# Patient Record
Sex: FEMALE | Race: WHITE | Employment: FULL TIME | URBAN - METROPOLITAN AREA
[De-identification: names, ages, dates, MRNs, and addresses within clinical notes are randomized per-mention and may not be internally consistent; named-entity substitution may affect disease eponyms.]

---

## 2021-11-05 ENCOUNTER — HOSPITAL ENCOUNTER (INPATIENT)
Age: 51
LOS: 5 days | Discharge: HOME | DRG: 291 | End: 2021-11-10
Attending: STUDENT IN AN ORGANIZED HEALTH CARE EDUCATION/TRAINING PROGRAM | Admitting: HOSPITALIST
Payer: COMMERCIAL

## 2021-11-05 ENCOUNTER — APPOINTMENT (OUTPATIENT)
Dept: GENERAL RADIOLOGY | Age: 51
DRG: 291 | End: 2021-11-05
Attending: STUDENT IN AN ORGANIZED HEALTH CARE EDUCATION/TRAINING PROGRAM
Payer: COMMERCIAL

## 2021-11-05 DIAGNOSIS — R77.8 ELEVATED TROPONIN: Primary | ICD-10-CM

## 2021-11-05 DIAGNOSIS — I48.91 RAPID ATRIAL FIBRILLATION (HCC): ICD-10-CM

## 2021-11-05 DIAGNOSIS — I50.9 HEART FAILURE, UNSPECIFIED HF CHRONICITY, UNSPECIFIED HEART FAILURE TYPE (HCC): ICD-10-CM

## 2021-11-05 PROBLEM — Q24.8 HYPERTROPHIC OBSTRUCTIVE CARDIOMYOPATHY, CONGENITAL: Status: ACTIVE | Noted: 2021-11-05

## 2021-11-05 LAB
ALBUMIN SERPL-MCNC: 3.2 G/DL (ref 3.5–5)
ALBUMIN/GLOB SERPL: 1 {RATIO} (ref 1.1–2.2)
ALP SERPL-CCNC: 94 U/L (ref 45–117)
ALT SERPL-CCNC: 26 U/L (ref 12–78)
ANION GAP SERPL CALC-SCNC: 10 MMOL/L (ref 5–15)
AST SERPL W P-5'-P-CCNC: 20 U/L (ref 15–37)
BASOPHILS # BLD: 0.1 K/UL (ref 0–0.1)
BASOPHILS NFR BLD: 1 % (ref 0–1)
BILIRUB SERPL-MCNC: 0.4 MG/DL (ref 0.2–1)
BNP SERPL-MCNC: 7010 PG/ML
BUN SERPL-MCNC: 11 MG/DL (ref 6–20)
BUN/CREAT SERPL: 19 (ref 12–20)
CA-I BLD-MCNC: 8.8 MG/DL (ref 8.5–10.1)
CHLORIDE SERPL-SCNC: 96 MMOL/L (ref 97–108)
CO2 SERPL-SCNC: 22 MMOL/L (ref 21–32)
CREAT SERPL-MCNC: 0.58 MG/DL (ref 0.55–1.02)
DIFFERENTIAL METHOD BLD: ABNORMAL
EOSINOPHIL # BLD: 0.1 K/UL (ref 0–0.4)
EOSINOPHIL NFR BLD: 1 % (ref 0–7)
ERYTHROCYTE [DISTWIDTH] IN BLOOD BY AUTOMATED COUNT: 13.2 % (ref 11.5–14.5)
GLOBULIN SER CALC-MCNC: 3.3 G/DL (ref 2–4)
GLUCOSE SERPL-MCNC: 115 MG/DL (ref 65–100)
HCT VFR BLD AUTO: 40.5 % (ref 35–47)
HGB BLD-MCNC: 14.1 G/DL (ref 11.5–16)
IMM GRANULOCYTES # BLD AUTO: 0.1 K/UL (ref 0–0.04)
IMM GRANULOCYTES NFR BLD AUTO: 1 % (ref 0–0.5)
LYMPHOCYTES # BLD: 1.8 K/UL (ref 0.8–3.5)
LYMPHOCYTES NFR BLD: 14 % (ref 12–49)
MAGNESIUM SERPL-MCNC: 1.5 MG/DL (ref 1.6–2.4)
MCH RBC QN AUTO: 34.1 PG (ref 26–34)
MCHC RBC AUTO-ENTMCNC: 34.8 G/DL (ref 30–36.5)
MCV RBC AUTO: 97.8 FL (ref 80–99)
MONOCYTES # BLD: 1.9 K/UL (ref 0–1)
MONOCYTES NFR BLD: 14 % (ref 5–13)
NEUTS SEG # BLD: 9.1 K/UL (ref 1.8–8)
NEUTS SEG NFR BLD: 69 % (ref 32–75)
NRBC # BLD: 0 K/UL (ref 0–0.01)
NRBC BLD-RTO: 0 PER 100 WBC
PLATELET # BLD AUTO: 367 K/UL (ref 150–400)
PMV BLD AUTO: 11.2 FL (ref 8.9–12.9)
POTASSIUM SERPL-SCNC: 4.1 MMOL/L (ref 3.5–5.1)
PROT SERPL-MCNC: 6.5 G/DL (ref 6.4–8.2)
RBC # BLD AUTO: 4.14 M/UL (ref 3.8–5.2)
SODIUM SERPL-SCNC: 128 MMOL/L (ref 136–145)
TROPONIN-HIGH SENSITIVITY: 581 NG/L (ref 0–51)
WBC # BLD AUTO: 13 K/UL (ref 3.6–11)

## 2021-11-05 PROCEDURE — 96376 TX/PRO/DX INJ SAME DRUG ADON: CPT

## 2021-11-05 PROCEDURE — 36415 COLL VENOUS BLD VENIPUNCTURE: CPT

## 2021-11-05 PROCEDURE — 74011250636 HC RX REV CODE- 250/636: Performed by: STUDENT IN AN ORGANIZED HEALTH CARE EDUCATION/TRAINING PROGRAM

## 2021-11-05 PROCEDURE — 85025 COMPLETE CBC W/AUTO DIFF WBC: CPT

## 2021-11-05 PROCEDURE — 96374 THER/PROPH/DIAG INJ IV PUSH: CPT

## 2021-11-05 PROCEDURE — 80053 COMPREHEN METABOLIC PANEL: CPT

## 2021-11-05 PROCEDURE — 96361 HYDRATE IV INFUSION ADD-ON: CPT

## 2021-11-05 PROCEDURE — 74011000250 HC RX REV CODE- 250: Performed by: STUDENT IN AN ORGANIZED HEALTH CARE EDUCATION/TRAINING PROGRAM

## 2021-11-05 PROCEDURE — 83735 ASSAY OF MAGNESIUM: CPT

## 2021-11-05 PROCEDURE — 96375 TX/PRO/DX INJ NEW DRUG ADDON: CPT

## 2021-11-05 PROCEDURE — 83880 ASSAY OF NATRIURETIC PEPTIDE: CPT

## 2021-11-05 PROCEDURE — 65270000029 HC RM PRIVATE

## 2021-11-05 PROCEDURE — 96365 THER/PROPH/DIAG IV INF INIT: CPT

## 2021-11-05 PROCEDURE — 84484 ASSAY OF TROPONIN QUANT: CPT

## 2021-11-05 PROCEDURE — 93005 ELECTROCARDIOGRAM TRACING: CPT

## 2021-11-05 PROCEDURE — 99285 EMERGENCY DEPT VISIT HI MDM: CPT

## 2021-11-05 PROCEDURE — 71045 X-RAY EXAM CHEST 1 VIEW: CPT

## 2021-11-05 RX ORDER — SODIUM CHLORIDE 0.9 % (FLUSH) 0.9 %
5-40 SYRINGE (ML) INJECTION AS NEEDED
Status: DISCONTINUED | OUTPATIENT
Start: 2021-11-05 | End: 2021-11-10 | Stop reason: HOSPADM

## 2021-11-05 RX ORDER — APIXABAN 5 MG/1
5 TABLET, FILM COATED ORAL 2 TIMES DAILY
Status: ON HOLD | COMMUNITY
Start: 2021-08-11 | End: 2021-11-10 | Stop reason: SDUPTHER

## 2021-11-05 RX ORDER — METOPROLOL TARTRATE 5 MG/5ML
1.25 INJECTION INTRAVENOUS ONCE
Status: COMPLETED | OUTPATIENT
Start: 2021-11-05 | End: 2021-11-05

## 2021-11-05 RX ORDER — DILTIAZEM HYDROCHLORIDE 5 MG/ML
5 INJECTION INTRAVENOUS ONCE
Status: COMPLETED | OUTPATIENT
Start: 2021-11-05 | End: 2021-11-05

## 2021-11-05 RX ORDER — DILTIAZEM HCL/D5W 125 MG/125
0-15 PLASTIC BAG, INJECTION (ML) INTRAVENOUS
Status: DISCONTINUED | OUTPATIENT
Start: 2021-11-05 | End: 2021-11-07

## 2021-11-05 RX ORDER — LANOLIN ALCOHOL/MO/W.PET/CERES
400 CREAM (GRAM) TOPICAL DAILY
COMMUNITY

## 2021-11-05 RX ORDER — ACETAMINOPHEN 325 MG/1
650 TABLET ORAL
Status: DISCONTINUED | OUTPATIENT
Start: 2021-11-05 | End: 2021-11-10 | Stop reason: HOSPADM

## 2021-11-05 RX ORDER — METOPROLOL SUCCINATE 100 MG/1
100 TABLET, EXTENDED RELEASE ORAL DAILY
COMMUNITY
Start: 2021-08-08 | End: 2021-11-10

## 2021-11-05 RX ORDER — DILTIAZEM HCL/D5W 125 MG/125
0-15 PLASTIC BAG, INJECTION (ML) INTRAVENOUS
Status: DISCONTINUED | OUTPATIENT
Start: 2021-11-05 | End: 2021-11-05

## 2021-11-05 RX ORDER — SODIUM CHLORIDE 0.9 % (FLUSH) 0.9 %
5-40 SYRINGE (ML) INJECTION EVERY 8 HOURS
Status: DISCONTINUED | OUTPATIENT
Start: 2021-11-05 | End: 2021-11-10 | Stop reason: HOSPADM

## 2021-11-05 RX ORDER — DILTIAZEM HYDROCHLORIDE 5 MG/ML
5 INJECTION INTRAVENOUS
Status: COMPLETED | OUTPATIENT
Start: 2021-11-05 | End: 2021-11-05

## 2021-11-05 RX ORDER — FUROSEMIDE 10 MG/ML
20 INJECTION INTRAMUSCULAR; INTRAVENOUS
Status: COMPLETED | OUTPATIENT
Start: 2021-11-05 | End: 2021-11-05

## 2021-11-05 RX ADMIN — DILTIAZEM HYDROCHLORIDE 5 MG: 5 INJECTION INTRAVENOUS at 22:39

## 2021-11-05 RX ADMIN — Medication 2.5 MG/HR: at 23:00

## 2021-11-05 RX ADMIN — FUROSEMIDE 20 MG: 10 INJECTION, SOLUTION INTRAMUSCULAR; INTRAVENOUS at 22:39

## 2021-11-05 RX ADMIN — METOPROLOL TARTRATE 1.25 MG: 1 INJECTION, SOLUTION INTRAVENOUS at 20:38

## 2021-11-05 RX ADMIN — SODIUM CHLORIDE 500 ML: 9 INJECTION, SOLUTION INTRAVENOUS at 20:37

## 2021-11-05 RX ADMIN — DILTIAZEM HYDROCHLORIDE 5 MG: 5 INJECTION INTRAVENOUS at 21:28

## 2021-11-05 NOTE — ED TRIAGE NOTES
[facility] pt stated that she has cardiomyopathy with afib and has been taking magnesium supplements to help with it; it was prescribed by her electrophysiologist/cardiologist d/t her ICD; pt doubled up on it because she wasn't feeling well and they were about to travel; c/o feeling wiped out, SOB , and nausea

## 2021-11-06 ENCOUNTER — APPOINTMENT (OUTPATIENT)
Dept: NON INVASIVE DIAGNOSTICS | Age: 51
DRG: 291 | End: 2021-11-06
Attending: HOSPITALIST
Payer: COMMERCIAL

## 2021-11-06 LAB
25(OH)D3 SERPL-MCNC: 10.8 NG/ML (ref 30–100)
ALBUMIN SERPL-MCNC: 3.4 G/DL (ref 3.5–5)
ANION GAP SERPL CALC-SCNC: 10 MMOL/L (ref 5–15)
ANION GAP SERPL CALC-SCNC: 8 MMOL/L (ref 5–15)
BASOPHILS # BLD: 0.1 K/UL (ref 0–0.1)
BASOPHILS NFR BLD: 1 % (ref 0–1)
BUN SERPL-MCNC: 9 MG/DL (ref 6–20)
BUN SERPL-MCNC: 9 MG/DL (ref 6–20)
BUN/CREAT SERPL: 16 (ref 12–20)
BUN/CREAT SERPL: 16 (ref 12–20)
CA-I BLD-MCNC: 9 MG/DL (ref 8.5–10.1)
CA-I BLD-MCNC: 9.1 MG/DL (ref 8.5–10.1)
CHLORIDE SERPL-SCNC: 98 MMOL/L (ref 97–108)
CHLORIDE SERPL-SCNC: 98 MMOL/L (ref 97–108)
CHOLEST SERPL-MCNC: 176 MG/DL
CK SERPL-CCNC: 182 U/L (ref 26–192)
CO2 SERPL-SCNC: 25 MMOL/L (ref 21–32)
CO2 SERPL-SCNC: 25 MMOL/L (ref 21–32)
CREAT SERPL-MCNC: 0.55 MG/DL (ref 0.55–1.02)
CREAT SERPL-MCNC: 0.55 MG/DL (ref 0.55–1.02)
DIFFERENTIAL METHOD BLD: ABNORMAL
ECHO AO ASC DIAM: 2.7 CM
ECHO AO ROOT DIAM: 3.2 CM
ECHO AV AREA PEAK VELOCITY: 2.17 CM2
ECHO AV AREA VTI: 2.44 CM2
ECHO AV AREA/BSA PEAK VELOCITY: 1.4 CM2/M2
ECHO AV AREA/BSA VTI: 1.6 CM2/M2
ECHO AV MEAN GRADIENT: 2 MMHG
ECHO AV MEAN VELOCITY: 65.2 CM/S
ECHO AV PEAK GRADIENT: 4 MMHG
ECHO AV PEAK VELOCITY: 96.5 CM/S
ECHO AV VTI: 15.6 CM
ECHO LA AREA 2C: 17.4 CM2
ECHO LA MAJOR AXIS: 5.9 CM
ECHO LA MAJOR AXIS: 5.9 CM
ECHO LV E' SEPTAL VELOCITY: 4.37 CM/S
ECHO LV EDV A2C: 14 CM3
ECHO LV EDV A2C: 77.9 CM3
ECHO LV ESV A2C: 27.5 CM3
ECHO LV INTERNAL DIMENSION DIASTOLIC: 4.27 CM (ref 3.9–5.3)
ECHO LV INTERNAL DIMENSION SYSTOLIC: 3.02 CM
ECHO LV IVSD: 1.92 CM (ref 0.6–0.9)
ECHO LV MASS 2D: 262.2 G (ref 67–162)
ECHO LV MASS INDEX 2D: 171.3 G/M2 (ref 43–95)
ECHO LV POSTERIOR WALL DIASTOLIC: 1.13 CM (ref 0.6–0.9)
ECHO LVOT DIAM: 2 CM
ECHO LVOT PEAK GRADIENT: 2 MMHG
ECHO LVOT PEAK VELOCITY: 66.8 CM/S
ECHO LVOT SV: 38 CM3
ECHO LVOT VTI: 12.1 CM
ECHO MV A VELOCITY: 81.2 CM/S
ECHO MV AREA PHT: 6.67 CM2
ECHO MV E VELOCITY: 46.4 CM/S
ECHO MV E/A RATIO: 0.57
ECHO MV E/E' SEPTAL: 10.62
ECHO MV PRESSURE HALF TIME (PHT): 33 MS
ECHO MV REGURGITANT VTIA: 119 CM
ECHO PV MAX VELOCITY: 75.2 CM/S
ECHO PV MEAN GRADIENT: 1 MMHG
ECHO PV PEAK INSTANTANEOUS GRADIENT SYSTOLIC: 2 MMHG
ECHO PV VTI: 16.4 CM
ECHO TV MAX VELOCITY: 267 CM/S
ECHO TV MEAN GRADIENT: 53 MMHG
ECHO TV REGURGITANT PEAK GRADIENT: 29 MMHG
EOSINOPHIL # BLD: 0.1 K/UL (ref 0–0.4)
EOSINOPHIL NFR BLD: 1 % (ref 0–7)
ERYTHROCYTE [DISTWIDTH] IN BLOOD BY AUTOMATED COUNT: 13.1 % (ref 11.5–14.5)
GLUCOSE SERPL-MCNC: 106 MG/DL (ref 65–100)
GLUCOSE SERPL-MCNC: 107 MG/DL (ref 65–100)
HCT VFR BLD AUTO: 37 % (ref 35–47)
HDLC SERPL-MCNC: 31 MG/DL
HDLC SERPL: 5.7 {RATIO} (ref 0–5)
HGB BLD-MCNC: 12.8 G/DL (ref 11.5–16)
IMM GRANULOCYTES # BLD AUTO: 0.1 K/UL (ref 0–0.04)
IMM GRANULOCYTES NFR BLD AUTO: 1 % (ref 0–0.5)
LDLC SERPL CALC-MCNC: 104.4 MG/DL (ref 0–100)
LIPID PROFILE,FLP: ABNORMAL
LVOT MG: 1 MMHG
LYMPHOCYTES # BLD: 1.3 K/UL (ref 0.8–3.5)
LYMPHOCYTES NFR BLD: 15 % (ref 12–49)
MAGNESIUM SERPL-MCNC: 2.2 MG/DL (ref 1.6–2.4)
MAGNESIUM SERPL-MCNC: 2.2 MG/DL (ref 1.6–2.4)
MCH RBC QN AUTO: 34.1 PG (ref 26–34)
MCHC RBC AUTO-ENTMCNC: 34.6 G/DL (ref 30–36.5)
MCV RBC AUTO: 98.7 FL (ref 80–99)
MONOCYTES # BLD: 1.2 K/UL (ref 0–1)
MONOCYTES NFR BLD: 13 % (ref 5–13)
MV DEC SLOPE: 3330 MM/S2
MV DEC SLOPE: 4690 MM/S2
MV DEC SLOPE: 6040 MM/S2
NEUTS SEG # BLD: 6.5 K/UL (ref 1.8–8)
NEUTS SEG NFR BLD: 69 % (ref 32–75)
NRBC # BLD: 0 K/UL (ref 0–0.01)
NRBC BLD-RTO: 0 PER 100 WBC
PHOSPHATE SERPL-MCNC: 4 MG/DL (ref 2.6–4.7)
PLATELET # BLD AUTO: 343 K/UL (ref 150–400)
PMV BLD AUTO: 11.3 FL (ref 8.9–12.9)
POTASSIUM SERPL-SCNC: 3.6 MMOL/L (ref 3.5–5.1)
POTASSIUM SERPL-SCNC: 3.7 MMOL/L (ref 3.5–5.1)
RBC # BLD AUTO: 3.75 M/UL (ref 3.8–5.2)
SODIUM SERPL-SCNC: 131 MMOL/L (ref 136–145)
SODIUM SERPL-SCNC: 133 MMOL/L (ref 136–145)
TRIGL SERPL-MCNC: 203 MG/DL (ref ?–150)
TROPONIN-HIGH SENSITIVITY: 510 NG/L (ref 0–51)
TSH SERPL DL<=0.05 MIU/L-ACNC: 2.24 UIU/ML (ref 0.36–3.74)
URATE SERPL-MCNC: 6.3 MG/DL (ref 2.6–6)
VLDLC SERPL CALC-MCNC: 40.6 MG/DL
WBC # BLD AUTO: 9.1 K/UL (ref 3.6–11)

## 2021-11-06 PROCEDURE — 84550 ASSAY OF BLOOD/URIC ACID: CPT

## 2021-11-06 PROCEDURE — 84443 ASSAY THYROID STIM HORMONE: CPT

## 2021-11-06 PROCEDURE — 94760 N-INVAS EAR/PLS OXIMETRY 1: CPT

## 2021-11-06 PROCEDURE — 83735 ASSAY OF MAGNESIUM: CPT

## 2021-11-06 PROCEDURE — 36415 COLL VENOUS BLD VENIPUNCTURE: CPT

## 2021-11-06 PROCEDURE — 65270000029 HC RM PRIVATE

## 2021-11-06 PROCEDURE — 74011250636 HC RX REV CODE- 250/636: Performed by: HOSPITALIST

## 2021-11-06 PROCEDURE — 82550 ASSAY OF CK (CPK): CPT

## 2021-11-06 PROCEDURE — 77010033678 HC OXYGEN DAILY

## 2021-11-06 PROCEDURE — 80048 BASIC METABOLIC PNL TOTAL CA: CPT

## 2021-11-06 PROCEDURE — 74011250637 HC RX REV CODE- 250/637: Performed by: HOSPITALIST

## 2021-11-06 PROCEDURE — 74011000250 HC RX REV CODE- 250: Performed by: HOSPITALIST

## 2021-11-06 PROCEDURE — 74011250636 HC RX REV CODE- 250/636: Performed by: INTERNAL MEDICINE

## 2021-11-06 PROCEDURE — 82306 VITAMIN D 25 HYDROXY: CPT

## 2021-11-06 PROCEDURE — 84484 ASSAY OF TROPONIN QUANT: CPT

## 2021-11-06 PROCEDURE — 80069 RENAL FUNCTION PANEL: CPT

## 2021-11-06 PROCEDURE — 80061 LIPID PANEL: CPT

## 2021-11-06 PROCEDURE — 93306 TTE W/DOPPLER COMPLETE: CPT

## 2021-11-06 PROCEDURE — 74011250637 HC RX REV CODE- 250/637: Performed by: PHYSICIAN ASSISTANT

## 2021-11-06 PROCEDURE — 85025 COMPLETE CBC W/AUTO DIFF WBC: CPT

## 2021-11-06 RX ORDER — METOPROLOL SUCCINATE 50 MG/1
100 TABLET, EXTENDED RELEASE ORAL DAILY
Status: DISCONTINUED | OUTPATIENT
Start: 2021-11-06 | End: 2021-11-06

## 2021-11-06 RX ORDER — METOPROLOL TARTRATE 5 MG/5ML
5 INJECTION INTRAVENOUS
Status: DISCONTINUED | OUTPATIENT
Start: 2021-11-06 | End: 2021-11-10 | Stop reason: HOSPADM

## 2021-11-06 RX ORDER — CALCIUM CARBONATE 200(500)MG
200 TABLET,CHEWABLE ORAL ONCE
Status: COMPLETED | OUTPATIENT
Start: 2021-11-06 | End: 2021-11-06

## 2021-11-06 RX ORDER — LANOLIN ALCOHOL/MO/W.PET/CERES
3 CREAM (GRAM) TOPICAL
Status: DISCONTINUED | OUTPATIENT
Start: 2021-11-06 | End: 2021-11-07 | Stop reason: SDUPTHER

## 2021-11-06 RX ORDER — SODIUM CHLORIDE 9 MG/ML
75 INJECTION, SOLUTION INTRAVENOUS CONTINUOUS
Status: DISCONTINUED | OUTPATIENT
Start: 2021-11-06 | End: 2021-11-06

## 2021-11-06 RX ORDER — METOPROLOL TARTRATE 25 MG/1
25 TABLET, FILM COATED ORAL ONCE
Status: COMPLETED | OUTPATIENT
Start: 2021-11-06 | End: 2021-11-06

## 2021-11-06 RX ORDER — PANTOPRAZOLE SODIUM 40 MG/1
40 TABLET, DELAYED RELEASE ORAL
Status: DISCONTINUED | OUTPATIENT
Start: 2021-11-06 | End: 2021-11-07

## 2021-11-06 RX ORDER — MAGNESIUM SULFATE HEPTAHYDRATE 40 MG/ML
2 INJECTION, SOLUTION INTRAVENOUS
Status: COMPLETED | OUTPATIENT
Start: 2021-11-06 | End: 2021-11-06

## 2021-11-06 RX ORDER — METOPROLOL SUCCINATE 50 MG/1
150 TABLET, EXTENDED RELEASE ORAL DAILY
Status: DISCONTINUED | OUTPATIENT
Start: 2021-11-06 | End: 2021-11-10 | Stop reason: HOSPADM

## 2021-11-06 RX ORDER — METOPROLOL SUCCINATE 50 MG/1
50 TABLET, EXTENDED RELEASE ORAL ONCE
Status: DISCONTINUED | OUTPATIENT
Start: 2021-11-06 | End: 2021-11-06

## 2021-11-06 RX ADMIN — METOPROLOL SUCCINATE 100 MG: 50 TABLET, EXTENDED RELEASE ORAL at 13:53

## 2021-11-06 RX ADMIN — SODIUM CHLORIDE 75 ML/HR: 9 INJECTION, SOLUTION INTRAVENOUS at 03:01

## 2021-11-06 RX ADMIN — METOPROLOL TARTRATE 25 MG: 25 TABLET, FILM COATED ORAL at 17:52

## 2021-11-06 RX ADMIN — Medication 15 MG/HR: at 07:35

## 2021-11-06 RX ADMIN — PANTOPRAZOLE SODIUM 40 MG: 40 TABLET, DELAYED RELEASE ORAL at 10:38

## 2021-11-06 RX ADMIN — APIXABAN 5 MG: 5 TABLET, FILM COATED ORAL at 10:38

## 2021-11-06 RX ADMIN — MAGNESIUM SULFATE HEPTAHYDRATE 2 G: 40 INJECTION, SOLUTION INTRAVENOUS at 11:56

## 2021-11-06 RX ADMIN — MAGNESIUM SULFATE HEPTAHYDRATE 2 G: 40 INJECTION, SOLUTION INTRAVENOUS at 10:37

## 2021-11-06 RX ADMIN — MELATONIN TAB 3 MG 3 MG: 3 TAB at 21:50

## 2021-11-06 RX ADMIN — Medication 5 ML: at 22:06

## 2021-11-06 RX ADMIN — Medication 10 ML: at 14:02

## 2021-11-06 RX ADMIN — ACETAMINOPHEN 650 MG: 325 TABLET ORAL at 10:37

## 2021-11-06 RX ADMIN — APIXABAN 5 MG: 5 TABLET, FILM COATED ORAL at 21:50

## 2021-11-06 RX ADMIN — CALCIUM CARBONATE 200 MG: 500 TABLET, CHEWABLE ORAL at 03:00

## 2021-11-06 NOTE — DISCHARGE INSTRUCTIONS
INSTRUCTIONS ON DISCHARGE     (1) please continue to take magnesium and metoprolol    (2) Please f.u with your PCP Patient's first and last name, , procedure, and correct site confirmed prior to the start of procedure.

## 2021-11-06 NOTE — PROGRESS NOTES
Reason for Admission:  Atrial fibrillation with RVR                   RUR Score:  7%                   Plan for utilizing home health:  None       PCP: First and Last name:  None   Patient states that her PCP is in [place] and she did not want to give CM PCP name. Name of Practice:    Are you a current patient: Yes/No: Yes   Approximate date of last visit:    Can you participate in a virtual visit with your PCP:                     Current Advanced Directive/Advance Care Plan: Full Code    Healthcare Decision Maker:   Click here to complete [address] including selection of the [address] Relationship (ie \"Primary\")           Lexx Hilario (Spouse)- [phone]                  Transition of Care Plan:  CM met with patient and spouse at bedside to complete assessment. Patient lives with spouse. Uses no DME/HH, independent in ADL's. Patient does not drive, spouse provides transportation and will transport home upon discharge. DC plan: home, self care.

## 2021-11-06 NOTE — DISCHARGE SUMMARY
Physician Discharge Summary     Patient ID:    Frankey Ross  [id number]  45 y.o.  1970    Admit date: 11/5/2021    Discharge date : 11/6/2021    Chronic Diagnoses:    Problem List as of 11/6/2021 Date Reviewed: 11/5/2021          Codes Class Noted - Resolved    Atrial fibrillation with RVR ([facility] [address] ICD-10-CM: I48.91  ICD-9-CM: 427.31  11/5/2021 - Present        Hypertrophic obstructive cardiomyopathy, congenital ICD-10-CM: Q24.8  ICD-9-CM: 746.84  11/5/2021 - Present        Atrial fibrillation ([facility] [address] ICD-10-CM: I48.91  ICD-9-CM: 427.31  11/5/2021 - Present          22    Final Diagnoses:   Atrial fibrillation ([facility] [address] [I48.91]  Hypertrophic obstructive cardiomyopathy, congenital [Q24.8]    Reason for Hospitalization:  Afib with RVR  congestional cardiomyopathy with aortic stenosis  HTN      Hospital Course:     45 y.o. female with history of congenital hypertrophic obstructive cardiomyopathy with atrial fibrillation on anticoagulation presents to the emergency room complaining of not feeling good. She is from [place], traveling at this time. But started having shortness of breath that was increasing in severity to the point that she was not able to function well so presented to the emergency room. States that she had congenital heart disease in the taking medications regularly. She was taking magnesium supplements thinking that it will help the heart rate control. She denies any chest pain, nausea or vomiting. She feels racing of her heart and feels no energy to do anything. No fever or chills. Denies any exacerbating relieving factors. Evaluation in the emergency room she is found with atrial fibrillation with rapid ventricular rate, admitted for further management. She started on IV diltiazem but did not get much improvement in heart rate. She is still in the 140s.     During the course of her stay, she was started on cardizem gtt    Her magnesium was replaced, and she reverted to NSR  cardizem gtt was weaned and she was planned for discharge    [address]     (1) please continue to take magnesium and metoprolol    (2) Please f.u with your PCP        Discharge Medications:   Current Discharge Medication List      CONTINUE these medications which have NOT CHANGED    Details   magnesium oxide (MAG-OX) 400 mg tablet Take 400 mg by mouth daily. OTC      Eliquis 5 mg tablet Take 5 mg by mouth two (2) times a day. metoprolol succinate (TOPROL-XL) 100 mg tablet Take 100 mg by mouth daily. Follow up Care:    1. None in 1-2 weeks. Please call to set up an appointment shortly after discharge. Diet:  regular    Disposition:  home    Advanced Directive:   FULL x   DNR      Discharge Exam:  General : Looks tired, not in any respiratory distress. HEENT : PERRLA, normal oral mucosa, atraumatic normocephalic, Normal ear and nose. Neck : Supple, no JVD, no masses noted, no carotid bruit. Lungs : Breath sounds with good air entry bilaterally, no wheezes or rales, no accessory muscle use. CVS : Irregular rate and rhythm, monitor showing atrial fibrillation with rapid ventricular rate with heart rate around 1 30-1 40. Abdomen : Soft, nontender, no organomegaly, bowel sounds active. Extremities : No edema noted,  pedal pulses palpable. Musculoskeletal : Fair range of motion, no joint swelling or effusion, muscle tone and power appears normal.   Skin : Moist, warm, skin turgor, no pathological rash. Lymphatic : No cervical lymphadenopathy. Neurological : Awake, alert, oriented to time place person. No neurological deficits. Psychiatric : Mood and affect appears appropriate to the situation. CONSULTATIONS:cards    Significant Diagnostic Studies:     Radiologic Studies -   Results from Hospital Encounter encounter on 11/05/21    XR CHEST PORT    Narrative  Portable chest    Short of breath.     Impression  No previous exams are available for comparison. Patient has a 2-lead ICD in good  position. The generator is over the left anterior chest. There is no  complication. There is cardiomegaly. There is interstitial edema. There is airspace edema the  right lung base. There are small bilateral pleural fluid collection. There is no  pneumothorax. There is no mass or nodule. There is no free intraperitoneal air. These findings are consistent with acute congestive failure.      CT Results  (Last 48 hours)    None              Discharge time spent 35 minutes    Signed:  Treva Shah MD  11/6/2021  3:01 PM

## 2021-11-06 NOTE — ED NOTES
Bedside shift change report given to JASON Gates RN (oncoming nurse) by Nancy Osei (offgoing nurse). Report included the following information SBAR, ED Summary, Procedure Summary, Intake/Output, MAR, Recent Results and Cardiac Rhythm Afib RvR.

## 2021-11-06 NOTE — ED PROVIDER NOTES
EMERGENCY DEPARTMENT HISTORY AND PHYSICAL EXAM      Date: 11/5/2021  Patient Name: Nati Cornejo    History of Presenting Illness     Chief Complaint   Patient presents with    Shortness of Breath    Gas    Nausea       History Provided By: Patient    HPI: Nati Cornejo, 45 y.o. female with a past medical history significant Cardiomyopathy, atrial fibrillation presents to the ED with cc of palpitations, shortness of breath. Patient states over the last 2 days has felt palpitations, consistent with paroxysmal atrial fibrillation. Patient is from [place] is traveling did not see a physician however states has noticed worsening shortness of breath today came to emergency department for evaluation. Patient denies any fevers chills denies any chest pains denies any nausea vomiting patient has noticed some worsening swelling to her lower extremities recently. Patient states that she has been taking p.o. magnesium as prescribed by her cardiologist for atrial fibrillation states that she takes 400 to 800 mg daily and has had severe abdominal discomfort from dose with included loose stool and diarrhea over the last 2 days. There are no other complaints, changes, or physical findings at this time. PCP: None    Current Facility-Administered Medications   Medication Dose Route Frequency Provider Last Rate Last Admin    dilTIAZem (CARDIZEM) 125 mg/125 mL (1 mg/mL) dextrose 5% infusion  0-15 mg/hr IntraVENous TITRATE Heidy Little MD         Current Outpatient Medications   Medication Sig Dispense Refill    magnesium oxide (MAG-OX) 400 mg tablet Take 400 mg by mouth daily. OTC      Eliquis 5 mg tablet Take 5 mg by mouth two (2) times a day.  metoprolol succinate (TOPROL-XL) 100 mg tablet Take 100 mg by mouth daily.          Past History     Past Medical History:  Past Medical History:   Diagnosis Date    A-fib [facility])        Past Surgical History:  No past surgical history on file.    Family History:  No family history on file. Social History:  Social History     Tobacco Use    Smoking status: Not on file    Smokeless tobacco: Not on file   Substance Use Topics    Alcohol use: Not on file    Drug use: Not on file       Allergies: Allergies   Allergen Reactions    Antihistamines - Alkylamine Other (comments)     Rapid HR          Review of Systems     Review of Systems   Constitutional: Negative for activity change, chills and fever. HENT: Negative for congestion and sore throat. Eyes: Negative for photophobia and visual disturbance. Respiratory: Positive for chest tightness and shortness of breath. Negative for apnea. Cardiovascular: Positive for palpitations and leg swelling. Negative for chest pain. Gastrointestinal: Positive for abdominal pain and diarrhea. Negative for blood in stool, nausea and vomiting. Genitourinary: Negative for dysuria. Musculoskeletal: Negative for arthralgias and back pain. Skin: Negative for color change. Neurological: Negative for dizziness, weakness, numbness and headaches. Psychiatric/Behavioral: Negative for agitation and confusion. Physical Exam     Physical Exam  Vitals and nursing note reviewed. Constitutional:       General: She is not in acute distress. Appearance: Normal appearance. She is normal weight. She is not ill-appearing. HENT:      Head: Normocephalic and atraumatic. Nose: Nose normal.      Mouth/Throat:      Mouth: Mucous membranes are moist.   Eyes:      Extraocular Movements: Extraocular movements intact. Pupils: Pupils are equal, round, and reactive to light. Cardiovascular:      Rate and Rhythm: Tachycardia present. Rhythm irregular. Pulses: Normal pulses. Pulmonary:      Effort: Pulmonary effort is normal.      Breath sounds: Normal breath sounds. Abdominal:      General: Abdomen is flat. Bowel sounds are normal.      Palpations: Abdomen is soft. Tenderness:  There is no abdominal tenderness. There is no guarding. Musculoskeletal:         General: Normal range of motion. Cervical back: Normal range of motion and neck supple. No muscular tenderness. Right lower leg: Edema present. Left lower leg: Edema present. Skin:     General: Skin is warm and dry. Neurological:      General: No focal deficit present. Mental Status: She is alert and oriented to person, place, and time. Sensory: No sensory deficit. Motor: No weakness. Diagnostic Study Results     Labs -     Recent Results (from the past 12 hour(s))   CBC WITH AUTOMATED DIFF    Collection Time: 11/05/21  8:17 PM   Result Value Ref Range    WBC 13.0 (H) 3.6 - 11.0 K/uL    RBC 4.14 3.80 - 5.20 M/uL    HGB 14.1 11.5 - 16.0 g/dL    HCT 40.5 35.0 - 47.0 %    MCV 97.8 80.0 - 99.0 FL    MCH 34.1 (H) 26.0 - 34.0 PG    MCHC 34.8 30.0 - 36.5 g/dL    RDW 13.2 11.5 - 14.5 %    PLATELET 665 414 - 372 K/uL    MPV 11.2 8.9 - 12.9 FL    NRBC 0.0 0.0  WBC    ABSOLUTE NRBC 0.00 0.00 - 0.01 K/uL    NEUTROPHILS 69 32 - 75 %    LYMPHOCYTES 14 12 - 49 %    MONOCYTES 14 (H) 5 - 13 %    EOSINOPHILS 1 0 - 7 %    BASOPHILS 1 0 - 1 %    IMMATURE GRANULOCYTES 1 (H) 0 - 0.5 %    ABS. NEUTROPHILS 9.1 (H) 1.8 - 8.0 K/UL    ABS. LYMPHOCYTES 1.8 0.8 - 3.5 K/UL    ABS. MONOCYTES 1.9 (H) 0.0 - 1.0 K/UL    ABS. EOSINOPHILS 0.1 0.0 - 0.4 K/UL    ABS. BASOPHILS 0.1 0.0 - 0.1 K/UL    ABS. IMM.  GRANS. 0.1 (H) 0.00 - 0.04 K/UL    DF AUTOMATED     NT-PRO BNP    Collection Time: 11/05/21  9:40 PM   Result Value Ref Range    NT pro-BNP 7,010 (H) <125 pg/mL   TROPONIN-HIGH SENSITIVITY    Collection Time: 11/05/21  9:40 PM   Result Value Ref Range    Troponin-High Sensitivity 581 (HH) 0 - 51 ng/L   MAGNESIUM    Collection Time: 11/05/21  9:40 PM   Result Value Ref Range    Magnesium 1.5 (L) 1.6 - 2.4 mg/dL   METABOLIC PANEL, COMPREHENSIVE    Collection Time: 11/05/21  9:40 PM   Result Value Ref Range    Sodium 128 (L) 136 - 145 mmol/L    Potassium 4.1 3.5 - 5.1 mmol/L    Chloride 96 (L) 97 - 108 mmol/L    CO2 22 21 - 32 mmol/L    Anion gap 10 5 - 15 mmol/L    Glucose 115 (H) 65 - 100 mg/dL    BUN 11 6 - 20 mg/dL    Creatinine 0.58 0.55 - 1.02 mg/dL    BUN/Creatinine ratio 19 12 - 20      GFR est AA >60 >60 ml/min/1.73m2    GFR est non-AA >60 >60 ml/min/1.73m2    Calcium 8.8 8.5 - 10.1 mg/dL    Bilirubin, total 0.4 0.2 - 1.0 mg/dL    AST (SGOT) 20 15 - 37 U/L    ALT (SGPT) 26 12 - 78 U/L    Alk. phosphatase 94 45 - 117 U/L    Protein, total 6.5 6.4 - 8.2 g/dL    Albumin 3.2 (L) 3.5 - 5.0 g/dL    Globulin 3.3 2.0 - 4.0 g/dL    A-G Ratio 1.0 (L) 1.1 - 2.2         Radiologic Studies -   [unfilled]  CT Results  (Last 48 hours)    None        CXR Results  (Last 48 hours)               11/05/21 2038  XR CHEST PORT Final result    Impression:  No previous exams are available for comparison. Patient has a 2-lead ICD in good   position. The generator is over the left anterior chest. There is no   complication. There is cardiomegaly. There is interstitial edema. There is airspace edema the   right lung base. There are small bilateral pleural fluid collection. There is no   pneumothorax. There is no mass or nodule. There is no free intraperitoneal air. These findings are consistent with acute congestive failure. Narrative:  Portable chest       Short of breath. Medical Decision Making and ED Course   I am the first provider for this patient. I reviewed the vital signs, available nursing notes, past medical history, past surgical history, family history and social history. Vital Signs-Reviewed the patient's vital signs.   Patient Vitals for the past 12 hrs:   Temp Pulse Resp BP SpO2   11/05/21 2238  (!) 150 22 105/88 95 %   11/05/21 2131 97.9 °F (36.6 °C) (!) 149 28 (!) 106/90 92 %   11/05/21 2128  (!) 147  105/86    11/05/21 2109  (!) 135 30 105/86 94 %   11/05/21 2044  (!) 153 (!) 35 (!) 105/91 95 %   11/05/21 2028  (!) 150 (!) 35 (!) 115/102 92 %   11/05/21 2002 97.7 °F (36.5 °C) (!) 146 16 99/76 97 %       EKG interpretation: (Preliminary)  Atrial fibrillation 153, right bundle branch block    Records Reviewed: Nursing Notes    The patient presents with palpitations, shortness of breath  with a differential diagnosis of A. fib with RVR, congestive heart failure, ACS, NSTEMI, dehydration, sepsis      Provider Notes (Medical Decision Making):     MDM   57-year-old female, history of paroxysmal A. fib, currently on Eliquis, metoprolol, cardiomyopathy, presents emergency department for palpitations, shortness of breath. Physical exam shows uncomfortable female, however no distress, saturations 92 to 95% on room air, pulses 150 irregular, consistent with atrial fibrillation. Pressures low normal  systolic. Additionally patient has lower extremity edema approximately 2+ bilateral.    Concern for heart failure given history of cardiomyopathy, additionally patient suffering from some rapid A. fib, will give patient half a liter of normal saline, administer metoprolol 1.25 mg IV. Metoprolol did not affect heart rate, patient given Cardizem 5 mg IV with mild improvement, will place patient on Cardizem drip. ED Course:   Initial assessment performed. The patients presenting problems have been discussed, and they are in agreement with the care plan formulated and outlined with them. I have encouraged them to ask questions as they arise throughout their visit. ED Course as of 11/05/21 2253   Eloy Nava Nov 05, 2021 2235 Patient's lab work resulting, metabolic panel shows hyponatremia 128, troponin positive at 580, BNP elevated at 7000 additionally patient's magnesium reads low at 1.5? I discussed results with patient, informed patient that she may have suffered cardiac ischemia possibly due to rapid A. fib possibly due to underlying cardiac illness.   Additionally patient exhibiting signs of cardiac failure and fluid overload. Recommend admission, patient will amenable. Patient additionally states that her magnesium is chronically low which is why she is on it, usually around 1.5. [PZ]   3583 Patient's x-ray concerning for vascular congestion, mild interstitial edema consistent with fluid overload, IV fluids discontinued, will give patient Lasix 20 mg IV. Patient remains comfortable that she is having mild dyspnea however no worse than when she came, saturations remain low 90s on room air placed on 2 L nasal cannula. [PZ]   2251 Discussed case with Dr. Remigio Bates, will admit patient. [PZ]      ED Course User Index  [PZ] Conchita Landa MD         Procedures       Jerson Almonte MD  Procedures               CRITICAL CARE NOTE :  10:36 PM  Amount of Critical Care Time: __60__(minutes)__    IMPENDING DETERIORATION -Cardiovascular and Metabolic  ASSOCIATED RISK FACTORS - Hypotension, Hypoxia, Dysrhythmia and Metabolic changes  MANAGEMENT- Bedside Assessment and Supervision of Care  INTERPRETATION -  Xrays, ECG and Blood Pressure  INTERVENTIONS - hemodynamic mngmt, vascular control and Metobolic interventions  CASE REVIEW - Hospitalist/Intensivist  TREATMENT RESPONSE -Stable  PERFORMED BY - Self    NOTES   :  I have spent critical care time involved in lab review, consultations with specialist, family decision- making, bedside attention and documentation. This time excludes time spent in any separate billed procedures. During this entire length of time I was immediately available to the patient . Jerson Almonte MD        Disposition     Admit patient        Diagnosis     Clinical Impression:   1. Elevated troponin    2. Heart failure, unspecified HF chronicity, unspecified heart failure type ([facility] [address]    3. Rapid atrial fibrillation [facility])        Attestations:    Jerson Almonte MD    Please note that this dictation was completed with [employer], the [employer] voice recognition software.   Quite often unanticipated grammatical, syntax, homophones, and other interpretive errors are inadvertently transcribed by the computer software. Please disregard these errors. Please excuse any errors that have escaped final proofreading. Thank you.

## 2021-11-06 NOTE — H&P
History and Physical              Subjective :   Chief Complaint : Not feeling good feels like, Shortness of breath. Source of information : Patient good historian    History of present illness:   45 y.o. female with history of congenital hypertrophic obstructive cardiomyopathy with atrial fibrillation on anticoagulation presents to the emergency room complaining of not feeling good. She is from [place], traveling at this time. But started having shortness of breath that was increasing in severity to the point that she was not able to function well so presented to the emergency room. States that she had congenital heart disease in the taking medications regularly. She was taking magnesium supplements thinking that it will help the heart rate control. She denies any chest pain, nausea or vomiting. She feels racing of her heart and feels no energy to do anything. No fever or chills. Denies any exacerbating relieving factors. Evaluation in the emergency room she is found with atrial fibrillation with rapid ventricular rate, admitted for further management. She started on IV diltiazem but did not get much improvement in heart rate. She is still in the 140s. Past Medical History:   Diagnosis Date    A-fib ([facility] [address]     Hypertrophic obstructive cardiomyopathy, congenital 11/5/2021     Past Surgical History:   Procedure Laterality Date    HX BIV-IMPLANTABLE CARDIOVERTER DEFIBRILLATOR       Family History   Problem Relation Age of Onset    No Known Problems Mother     No Known Problems Father       Social History     Tobacco Use    Smoking status: Former Smoker    Smokeless tobacco: Never Used    Tobacco comment: Quit recently   Substance Use Topics    Alcohol use: Not Currently       Prior to Admission medications    Medication Sig Start Date End Date Taking? Authorizing Provider   magnesium oxide (MAG-OX) 400 mg tablet Take 400 mg by mouth daily.  OTC   Yes Other, Phys, MD   Eliquis 5 mg tablet Take 5 mg by mouth two (2) times a day. 8/11/21   OtherVel MD   metoprolol succinate (TOPROL-XL) 100 mg tablet Take 100 mg by mouth daily. 8/8/21   Braxton, MD Vel     Allergies   Allergen Reactions    Antihistamines - Alkylamine Other (comments)     Rapid HR              Review of Systems:  Constitutional: Appetite is good, denies weight loss, no fever, no chills, no night sweats. Eye: No recent visual disturbances, no discharge, no double vision. Ear/nose/mouth/throat : No hearing disturbance, no ear pain, no nasal congestion, no sore throat, no trouble swallowing. Respiratory : No trouble breathing, no cough, no shortness of breath, no hemoptysis, no wheezing. Cardiovascular : Chest tightness and discomfort, feels palpitations and racing of heart. Admits some orthopnea. States has no history of heart failure. Gastrointestinal : No nausea, no vomiting,  No abdominal pain. Genitourinary : No dysuria, no hematuria, no increased frequency, No incontinence. Lymphatics : No swollen glands -Neck, axillary, inguinal.  Endocrine : No excessive thirst, No polyuria . Immunologic : No hives, urticaria, No seasonal allergies. Musculoskeletal : No joint swelling, No pain, No effusion,  No back pain, No neck pain. Integumentary : No rash, No pruritus, No ecchymosis. Hematology : No petechiae, No easy bruising,  No tendency to bleed easy. Neurology : Denies change in mental status, No abnormal balance, No headache, No confusion, No numbness or tingling. Psychiatric : No mood swings, No anxiety, No depression.     Vitals:     Patient Vitals for the past 12 hrs:   Temp Pulse Resp BP SpO2   11/05/21 2257  (!) 135 30 103/86 95 %   11/05/21 2238  (!) 150 22 105/88 95 %   11/05/21 2131 97.9 °F (36.6 °C) (!) 149 28 (!) 106/90 92 %   11/05/21 2128  (!) 147  105/86    11/05/21 2109  (!) 135 30 105/86 94 %   11/05/21 2044  (!) 153 (!) 35 (!) 105/91 95 %   11/05/21 2028  (!) 150 (!) 35 (!) 115/102 92 % 11/05/21 2002 97.7 °F (36.5 °C) (!) 146 16 99/76 97 %       Physical Exam:   General : Looks tired, not in any respiratory distress. HEENT : PERRLA, normal oral mucosa, atraumatic normocephalic, Normal ear and nose. Neck : Supple, no JVD, no masses noted, no carotid bruit. Lungs : Breath sounds with good air entry bilaterally, no wheezes or rales, no accessory muscle use. CVS : Irregular rate and rhythm, monitor showing atrial fibrillation with rapid ventricular rate with heart rate around 1 30-1 40. Abdomen : Soft, nontender, no organomegaly, bowel sounds active. Extremities : No edema noted,  pedal pulses palpable. Musculoskeletal : Fair range of motion, no joint swelling or effusion, muscle tone and power appears normal.   Skin : Moist, warm, skin turgor, no pathological rash. Lymphatic : No cervical lymphadenopathy. Neurological : Awake, alert, oriented to time place person. No neurological deficits. Psychiatric : Mood and affect appears appropriate to the situation. Data Review:   Recent Results (from the past 24 hour(s))   CBC WITH AUTOMATED DIFF    Collection Time: 11/05/21  8:17 PM   Result Value Ref Range    WBC 13.0 (H) 3.6 - 11.0 K/uL    RBC 4.14 3.80 - 5.20 M/uL    HGB 14.1 11.5 - 16.0 g/dL    HCT 40.5 35.0 - 47.0 %    MCV 97.8 80.0 - 99.0 FL    MCH 34.1 (H) 26.0 - 34.0 PG    MCHC 34.8 30.0 - 36.5 g/dL    RDW 13.2 11.5 - 14.5 %    PLATELET 964 077 - 058 K/uL    MPV 11.2 8.9 - 12.9 FL    NRBC 0.0 0.0  WBC    ABSOLUTE NRBC 0.00 0.00 - 0.01 K/uL    NEUTROPHILS 69 32 - 75 %    LYMPHOCYTES 14 12 - 49 %    MONOCYTES 14 (H) 5 - 13 %    EOSINOPHILS 1 0 - 7 %    BASOPHILS 1 0 - 1 %    IMMATURE GRANULOCYTES 1 (H) 0 - 0.5 %    ABS. NEUTROPHILS 9.1 (H) 1.8 - 8.0 K/UL    ABS. LYMPHOCYTES 1.8 0.8 - 3.5 K/UL    ABS. MONOCYTES 1.9 (H) 0.0 - 1.0 K/UL    ABS. EOSINOPHILS 0.1 0.0 - 0.4 K/UL    ABS. BASOPHILS 0.1 0.0 - 0.1 K/UL    ABS. IMM.  GRANS. 0.1 (H) 0.00 - 0.04 K/UL    DF AUTOMATED NT-PRO BNP    Collection Time: 11/05/21  9:40 PM   Result Value Ref Range    NT pro-BNP 7,010 (H) <125 pg/mL   TROPONIN-HIGH SENSITIVITY    Collection Time: 11/05/21  9:40 PM   Result Value Ref Range    Troponin-High Sensitivity 581 (HH) 0 - 51 ng/L   MAGNESIUM    Collection Time: 11/05/21  9:40 PM   Result Value Ref Range    Magnesium 1.5 (L) 1.6 - 2.4 mg/dL   METABOLIC PANEL, COMPREHENSIVE    Collection Time: 11/05/21  9:40 PM   Result Value Ref Range    Sodium 128 (L) 136 - 145 mmol/L    Potassium 4.1 3.5 - 5.1 mmol/L    Chloride 96 (L) 97 - 108 mmol/L    CO2 22 21 - 32 mmol/L    Anion gap 10 5 - 15 mmol/L    Glucose 115 (H) 65 - 100 mg/dL    BUN 11 6 - 20 mg/dL    Creatinine 0.58 0.55 - 1.02 mg/dL    BUN/Creatinine ratio 19 12 - 20      GFR est AA >60 >60 ml/min/1.73m2    GFR est non-AA >60 >60 ml/min/1.73m2    Calcium 8.8 8.5 - 10.1 mg/dL    Bilirubin, total 0.4 0.2 - 1.0 mg/dL    AST (SGOT) 20 15 - 37 U/L    ALT (SGPT) 26 12 - 78 U/L    Alk. phosphatase 94 45 - 117 U/L    Protein, total 6.5 6.4 - 8.2 g/dL    Albumin 3.2 (L) 3.5 - 5.0 g/dL    Globulin 3.3 2.0 - 4.0 g/dL    A-G Ratio 1.0 (L) 1.1 - 2.2         Radiologic Studies :     CXR Results  (Last 48 hours)               11/05/21 2038  XR CHEST PORT Final result    Impression:  No previous exams are available for comparison. Patient has a 2-lead ICD in good   position. The generator is over the left anterior chest. There is no   complication. There is cardiomegaly. There is interstitial edema. There is airspace edema the   right lung base. There are small bilateral pleural fluid collection. There is no   pneumothorax. There is no mass or nodule. There is no free intraperitoneal air. These findings are consistent with acute congestive failure. Narrative:  Portable chest       Short of breath. Assessment and Plan :     Atrial fibrillation with rapid ventricular rate: On IV diltiazem with 2.5 mg.   We have to aggressively control the heart rate in this patient with hypertrophic cardiomyopathy, increase diltiazem to 10 mg drip. Heart rate is slowly improving. Chest x-ray with hydrostatic edema as per radiology, most likely secondary to uncontrolled heart rate. This patient will be in volume dependent due to aortic stenosis due to hypertrophy. Even though she has vascular congestion likely from uncontrolled heart rate with mild pulmonary edema states she may benefit with the fluids rather than diuretics. As she is not responding to the increase in diltiazem dose will try to IV fluids to see how she does with it. Clinically patient looks dehydrated with dry skin with poor turgor. On chronic anticoagulation with Eliquis which we will continue    Admitted to cardiac telemetry, full CODE STATUS, home medications reviewed with patient. On anticoagulation. CC : None  Signed By: Femi Samuel MD     November 5, 2021      This dictation was done by dragon, computer voice recognition software. Often unanticipated grammatical, syntax, [place] phones and other interpretive errors are inadvertently transcribed. Please excuse errors that have escaped final proofreading.

## 2021-11-06 NOTE — CONSULTS
Consult    Patient: Sofia Hawley MRN: [id number]  SSN: [id number]    YOB: 1970  Age: 45 y.o. Sex: female       Subjective:      Date of  Admission: 11/5/2021     Admission type: Emergency    Sofia Hawley is a 45 y.o. female history of HOCM status post ICD, atrial fibrillation. Previous records are not available hence source of this history is only patient and spouse at the bedside. She has a history of hypertrophic obstructive cardiomyopathy and has a BiV ICD. She also has a history of atrial fibrillation. She was on sotalol in the past however she lost insurance and abruptly stopped taking sotalol. She is currently on Toprol all for her atrial fibrillation. She has never been on propafenone. She follows with electrophysiology at [facility] in [place] as she is a resident of [place] and is currently visiting [place]. She is admitted with complaints of palpitations. She was found to be in rapid atrial fibrillation hence we are asked to see her. She has had no dizziness, lightheadedness or syncope. She admits to shortness of breath on exertion. She has had no chest pain. There are no other complaints.     Primary Care Provider: None  Past Medical History:   Diagnosis Date    A[facility]fib ([facility] [address]     Hypertrophic obstructive cardiomyopathy, congenital 11/5/2021      Past Surgical History:   Procedure Laterality Date    HX BIV-IMPLANTABLE CARDIOVERTER DEFIBRILLATOR       Family History   Problem Relation Age of Onset    No Known Problems Mother     No Known Problems Father       Social History     Tobacco Use    Smoking status: Former Smoker    Smokeless tobacco: Never Used    Tobacco comment: Quit recently   Substance Use Topics    Alcohol use: Not Currently      Current Facility-Administered Medications   Medication Dose Route Frequency    pantoprazole (PROTONIX) tablet 40 mg  40 mg Oral ACB&D    metoprolol succinate (TOPROL-XL) XL tablet 100 mg 100 mg Oral DAILY    dilTIAZem (CARDIZEM) 125 mg/125 mL (1 mg/mL) dextrose 5% infusion  0-15 mg/hr IntraVENous TITRATE    apixaban (ELIQUIS) tablet 5 mg  5 mg Oral BID    sodium chloride (NS) flush 5-40 mL  5-40 mL IntraVENous Q8H    sodium chloride (NS) flush 5-40 mL  5-40 mL IntraVENous PRN    acetaminophen (TYLENOL) tablet 650 mg  650 mg Oral Q4H PRN        Allergies   Allergen Reactions    Antihistamines - Alkylamine Other (comments)     Rapid HR         Review of Systems:  A comprehensive review of systems was negative except for that written in the History of Present Illness. Subjective:     Visit Vitals  /72   Pulse (!) 116   Temp 97.8 °F (36.6 °C)   Resp 20   Ht 5' 1\" (1.549 m)   Wt 55 kg (121 lb 4.1 oz)   SpO2 91%   BMI 22.91 kg/m²        Physical Exam:  Visit Vitals  /72   Pulse (!) 116   Temp 97.8 °F (36.6 °C)   Resp 20   Ht 5' 1\" (1.549 m)   Wt 55 kg (121 lb 4.1 oz)   SpO2 91%   BMI 22.91 kg/m²     General Appearance:  Well developed, well nourished,alert and oriented x 3, and individual in no acute distress. Ears/Nose/Mouth/Throat:   Hearing grossly normal.         Neck: Supple. Chest:   Lungs clear to auscultation bilaterally. Cardiovascular:   Irregularly irregular, no murmur. Abdomen:   Soft, non-tender, bowel sounds are active. Extremities: No edema bilaterally. Skin: Warm and dry.                Cardiographics:  Telemetry: AFIB    Data Reviewed:   BMP:   Lab Results   Component Value Date/Time     (L) 11/06/2021 11:40 AM     (L) 11/06/2021 11:40 AM    K 3.6 11/06/2021 11:40 AM    K 3.7 11/06/2021 11:40 AM    CL 98 11/06/2021 11:40 AM    CL 98 11/06/2021 11:40 AM    CO2 25 11/06/2021 11:40 AM    CO2 25 11/06/2021 11:40 AM    AGAP 10 11/06/2021 11:40 AM    AGAP 8 11/06/2021 11:40 AM     (H) 11/06/2021 11:40 AM     (H) 11/06/2021 11:40 AM    BUN 9 11/06/2021 11:40 AM    BUN 9 11/06/2021 11:40 AM    CREA 0.55 11/06/2021 11:40 AM    CREA 0.55 11/06/2021 11:40 AM    GFRAA >60 11/06/2021 11:40 AM    GFRAA >60 11/06/2021 11:40 AM    GFRNA >60 11/06/2021 11:40 AM    GFRNA >60 11/06/2021 11:40 AM     CMP:   Lab Results   Component Value Date/Time     (L) 11/06/2021 11:40 AM     (L) 11/06/2021 11:40 AM    K 3.6 11/06/2021 11:40 AM    K 3.7 11/06/2021 11:40 AM    CL 98 11/06/2021 11:40 AM    CL 98 11/06/2021 11:40 AM    CO2 25 11/06/2021 11:40 AM    CO2 25 11/06/2021 11:40 AM    AGAP 10 11/06/2021 11:40 AM    AGAP 8 11/06/2021 11:40 AM     (H) 11/06/2021 11:40 AM     (H) 11/06/2021 11:40 AM    BUN 9 11/06/2021 11:40 AM    BUN 9 11/06/2021 11:40 AM    CREA 0.55 11/06/2021 11:40 AM    CREA 0.55 11/06/2021 11:40 AM    GFRAA >60 11/06/2021 11:40 AM    GFRAA >60 11/06/2021 11:40 AM    GFRNA >60 11/06/2021 11:40 AM    GFRNA >60 11/06/2021 11:40 AM    CA 9.1 11/06/2021 11:40 AM    CA 9.0 11/06/2021 11:40 AM    MG 2.2 11/06/2021 11:40 AM    MG 2.2 11/06/2021 11:40 AM    PHOS 4.0 11/06/2021 11:40 AM    ALB 3.4 (L) 11/06/2021 11:40 AM    TP 6.5 11/05/2021 09:40 PM    GLOB 3.3 11/05/2021 09:40 PM    AGRAT 1.0 (L) 11/05/2021 09:40 PM    ALT 26 11/05/2021 09:40 PM     CBC:   Lab Results   Component Value Date/Time    WBC 9.1 11/06/2021 11:40 AM    HGB 12.8 11/06/2021 11:40 AM    HCT 37.0 11/06/2021 11:40 AM     11/06/2021 11:40 AM     All Cardiac Markers in the last 24 hours:   Lab Results   Component Value Date/Time     11/06/2021 11:40 AM     Recent Glucose Results:   Lab Results   Component Value Date/Time     (H) 11/06/2021 11:40 AM     (H) 11/06/2021 11:40 AM     (H) 11/05/2021 09:40 PM     ABG: No results found for: PH, PHI, PCO2, PCO2I, PO2, PO2I, HCO3, HCO3I, FIO2, FIO2I  COAGS: No results found for: APTT, PTP, INR, INREXT, INREXT     Assessment:   Atrial fibrillation with RVR  Elevated troponins  Hypertrophic obstructive cardiomyopathy  Status post BiV ICD     Plan:   -Continue Toprol- mg p.o. daily.  Keep K above 4, mag above 2. Patient has never been on propafenone in the past however she has been on sotalol which she abruptly stopped about 2 years ago due to losing insurance. She follows with electrophysiology in [place] where she resides. For now, I would give her IV Lopressor as needed to optimize her rate control and uptitrate her outpatient Toprol-XL to 150 mg p.o. daily  -Significantly elevated troponins with flat trend likely non-MI related secondary to tachycardia in the setting of underlying hypertrophic myocardium. This is not consistent with acute coronary syndrome  -We will get a transthoracic echo today  -Once her Cardizem infusion has been discontinued and rate appears well controlled, she may be discharged from the cardiovascular standpoint for outpatient follow-up with her regular electrophysiologist in [facility]    Discussed with primary team.  Thank you for allowing us to participate in the care of your patient.

## 2021-11-07 ENCOUNTER — APPOINTMENT (OUTPATIENT)
Dept: GENERAL RADIOLOGY | Age: 51
DRG: 291 | End: 2021-11-07
Attending: HOSPITALIST
Payer: COMMERCIAL

## 2021-11-07 LAB
ANION GAP SERPL CALC-SCNC: 7 MMOL/L (ref 5–15)
BASOPHILS # BLD: 0.1 K/UL (ref 0–0.1)
BASOPHILS NFR BLD: 1 % (ref 0–1)
BUN SERPL-MCNC: 6 MG/DL (ref 6–20)
BUN/CREAT SERPL: 11 (ref 12–20)
CA-I BLD-MCNC: 8.9 MG/DL (ref 8.5–10.1)
CHLORIDE SERPL-SCNC: 101 MMOL/L (ref 97–108)
CO2 SERPL-SCNC: 25 MMOL/L (ref 21–32)
CREAT SERPL-MCNC: 0.54 MG/DL (ref 0.55–1.02)
DIFFERENTIAL METHOD BLD: ABNORMAL
EOSINOPHIL # BLD: 0.1 K/UL (ref 0–0.4)
EOSINOPHIL NFR BLD: 1 % (ref 0–7)
ERYTHROCYTE [DISTWIDTH] IN BLOOD BY AUTOMATED COUNT: 13.2 % (ref 11.5–14.5)
GLUCOSE SERPL-MCNC: 101 MG/DL (ref 65–100)
HCT VFR BLD AUTO: 38.1 % (ref 35–47)
HGB BLD-MCNC: 12.9 G/DL (ref 11.5–16)
IMM GRANULOCYTES # BLD AUTO: 0 K/UL (ref 0–0.04)
IMM GRANULOCYTES NFR BLD AUTO: 1 % (ref 0–0.5)
LYMPHOCYTES # BLD: 1.3 K/UL (ref 0.8–3.5)
LYMPHOCYTES NFR BLD: 15 % (ref 12–49)
MAGNESIUM SERPL-MCNC: 1.8 MG/DL (ref 1.6–2.4)
MCH RBC QN AUTO: 33.4 PG (ref 26–34)
MCHC RBC AUTO-ENTMCNC: 33.9 G/DL (ref 30–36.5)
MCV RBC AUTO: 98.7 FL (ref 80–99)
MONOCYTES # BLD: 1.2 K/UL (ref 0–1)
MONOCYTES NFR BLD: 13 % (ref 5–13)
NEUTS SEG # BLD: 6.1 K/UL (ref 1.8–8)
NEUTS SEG NFR BLD: 69 % (ref 32–75)
NRBC # BLD: 0 K/UL (ref 0–0.01)
NRBC BLD-RTO: 0 PER 100 WBC
PLATELET # BLD AUTO: 361 K/UL (ref 150–400)
PMV BLD AUTO: 11.3 FL (ref 8.9–12.9)
POTASSIUM SERPL-SCNC: 3.5 MMOL/L (ref 3.5–5.1)
RBC # BLD AUTO: 3.86 M/UL (ref 3.8–5.2)
SODIUM SERPL-SCNC: 133 MMOL/L (ref 136–145)
WBC # BLD AUTO: 8.7 K/UL (ref 3.6–11)

## 2021-11-07 PROCEDURE — 80048 BASIC METABOLIC PNL TOTAL CA: CPT

## 2021-11-07 PROCEDURE — 71045 X-RAY EXAM CHEST 1 VIEW: CPT

## 2021-11-07 PROCEDURE — 74011250637 HC RX REV CODE- 250/637: Performed by: HOSPITALIST

## 2021-11-07 PROCEDURE — 65270000029 HC RM PRIVATE

## 2021-11-07 PROCEDURE — 83735 ASSAY OF MAGNESIUM: CPT

## 2021-11-07 PROCEDURE — 94760 N-INVAS EAR/PLS OXIMETRY 1: CPT

## 2021-11-07 PROCEDURE — 74011250636 HC RX REV CODE- 250/636: Performed by: HOSPITALIST

## 2021-11-07 PROCEDURE — 74011250636 HC RX REV CODE- 250/636: Performed by: INTERNAL MEDICINE

## 2021-11-07 PROCEDURE — 85025 COMPLETE CBC W/AUTO DIFF WBC: CPT

## 2021-11-07 PROCEDURE — 36415 COLL VENOUS BLD VENIPUNCTURE: CPT

## 2021-11-07 PROCEDURE — 74011000250 HC RX REV CODE- 250: Performed by: INTERNAL MEDICINE

## 2021-11-07 RX ORDER — DILTIAZEM HCL/D5W 125 MG/125
5 PLASTIC BAG, INJECTION (ML) INTRAVENOUS CONTINUOUS
Status: DISCONTINUED | OUTPATIENT
Start: 2021-11-07 | End: 2021-11-10 | Stop reason: HOSPADM

## 2021-11-07 RX ORDER — CHOLECALCIFEROL TAB 125 MCG (5000 UNIT) 125 MCG
5000 TAB ORAL DAILY
Status: DISCONTINUED | OUTPATIENT
Start: 2021-11-07 | End: 2021-11-10 | Stop reason: HOSPADM

## 2021-11-07 RX ORDER — MAGNESIUM SULFATE HEPTAHYDRATE 40 MG/ML
2 INJECTION, SOLUTION INTRAVENOUS ONCE
Status: COMPLETED | OUTPATIENT
Start: 2021-11-07 | End: 2021-11-07

## 2021-11-07 RX ORDER — POTASSIUM CHLORIDE 20 MEQ/1
40 TABLET, EXTENDED RELEASE ORAL
Status: COMPLETED | OUTPATIENT
Start: 2021-11-07 | End: 2021-11-07

## 2021-11-07 RX ORDER — CHOLECALCIFEROL (VITAMIN D3) 125 MCG
5 CAPSULE ORAL
Status: DISCONTINUED | OUTPATIENT
Start: 2021-11-07 | End: 2021-11-10 | Stop reason: HOSPADM

## 2021-11-07 RX ORDER — FAMOTIDINE 20 MG/1
20 TABLET, FILM COATED ORAL DAILY
Status: DISCONTINUED | OUTPATIENT
Start: 2021-11-07 | End: 2021-11-10 | Stop reason: HOSPADM

## 2021-11-07 RX ORDER — LORAZEPAM 1 MG/1
1 TABLET ORAL
Status: DISCONTINUED | OUTPATIENT
Start: 2021-11-07 | End: 2021-11-10 | Stop reason: HOSPADM

## 2021-11-07 RX ADMIN — METOPROLOL TARTRATE 5 MG: 1 INJECTION, SOLUTION INTRAVENOUS at 16:49

## 2021-11-07 RX ADMIN — METOPROLOL TARTRATE 5 MG: 1 INJECTION, SOLUTION INTRAVENOUS at 20:50

## 2021-11-07 RX ADMIN — Medication 5000 UNITS: at 10:22

## 2021-11-07 RX ADMIN — LORAZEPAM 1 MG: 1 TABLET ORAL at 12:00

## 2021-11-07 RX ADMIN — SODIUM CHLORIDE 500 ML: 9 INJECTION, SOLUTION INTRAVENOUS at 22:12

## 2021-11-07 RX ADMIN — APIXABAN 5 MG: 5 TABLET, FILM COATED ORAL at 09:19

## 2021-11-07 RX ADMIN — METOPROLOL SUCCINATE 150 MG: 50 TABLET, EXTENDED RELEASE ORAL at 09:19

## 2021-11-07 RX ADMIN — Medication 10 ML: at 09:30

## 2021-11-07 RX ADMIN — MAGNESIUM SULFATE HEPTAHYDRATE 2 G: 40 INJECTION, SOLUTION INTRAVENOUS at 09:29

## 2021-11-07 RX ADMIN — METOPROLOL TARTRATE 5 MG: 1 INJECTION, SOLUTION INTRAVENOUS at 11:03

## 2021-11-07 RX ADMIN — FAMOTIDINE 20 MG: 20 TABLET, FILM COATED ORAL at 09:31

## 2021-11-07 RX ADMIN — Medication 5 ML: at 21:44

## 2021-11-07 RX ADMIN — Medication 10 ML: at 14:10

## 2021-11-07 RX ADMIN — POTASSIUM CHLORIDE 40 MEQ: 1500 TABLET, EXTENDED RELEASE ORAL at 09:29

## 2021-11-07 RX ADMIN — LORAZEPAM 1 MG: 1 TABLET ORAL at 18:07

## 2021-11-07 RX ADMIN — APIXABAN 5 MG: 5 TABLET, FILM COATED ORAL at 20:51

## 2021-11-07 RX ADMIN — Medication 5 MG/HR: at 23:06

## 2021-11-07 NOTE — PROGRESS NOTES
Progress Note    Patient: Rafal Floyd MRN: [id number]  SSN: [id number]    YOB: 1970  Age: 45 y.o. Sex: female      Admit Date: 11/5/2021    LOS: 2 days     Subjective:     51F, h/o congenital HOCM, pAFIB on eliquis and metoprolol    She presents with palpitations and was in Afib with RVR    Her magnesium was low, she takes protonix and has been taking for almost a year    Her mg was replaced , and IV fluids started    Her BB was increased to 150mg, cardizem gtt weanes    11/7: HR in 150 on getting up. Further input from cards    Review of Systems:  Constitutional: Appetite is good, denies weight loss, no fever, no chills, no night sweats. Eye: No recent visual disturbances, no discharge, no double vision. Ear/nose/mouth/throat : No hearing disturbance, no ear pain, no nasal congestion, no sore throat, no trouble swallowing. Respiratory : No trouble breathing, no cough, no shortness of breath, no hemoptysis, no wheezing. Cardiovascular : Chest tightness and discomfort, feels palpitations and racing of heart. Admits some orthopnea. States has no history of heart failure. Gastrointestinal : No nausea, no vomiting,  No abdominal pain. Genitourinary : No dysuria, no hematuria, no increased frequency, No incontinence. Lymphatics : No swollen glands -Neck, axillary, inguinal.  Endocrine : No excessive thirst, No polyuria . Immunologic : No hives, urticaria, No seasonal allergies. Musculoskeletal : No joint swelling, No pain, No effusion,  No back pain, No neck pain. Integumentary : No rash, No pruritus, No ecchymosis. Hematology : No petechiae, No easy bruising,  No tendency to bleed easy. Neurology : Denies change in mental status, No abnormal balance, No headache, No confusion, No numbness or tingling. Psychiatric : No mood swings, No anxiety, No depression.       Objective:     Vitals:    11/07/21 0516 11/07/21 0519 11/07/21 0718 11/07/21 0836   BP: 102/72  105/71    Pulse: (!) 113  96    Resp: 18  20    Temp: 97.5 °F (36.4 °C)  97.5 °F (36.4 °C)    SpO2: (!) 88% 94% 96% 100%   Weight:       Height:            Intake and Output:  Current Shift: No intake/output data recorded. Last three shifts: 11/05 1901 - 11/07 0700  In: 500 [I.V.:500]  Out: -       Physical Exam:   General : Looks tired, not in any respiratory distress. HEENT : PERRLA, normal oral mucosa, atraumatic normocephalic, Normal ear and nose. Neck : Supple, no JVD, no masses noted, no carotid bruit. Lungs : Breath sounds with good air entry bilaterally, no wheezes or rales, no accessory muscle use. CVS : Irregular rate and rhythm, monitor showing atrial fibrillation with rapid ventricular rate with heart rate around 1 30-1 40. Abdomen : Soft, nontender, no organomegaly, bowel sounds active. Extremities : No edema noted,  pedal pulses palpable. Musculoskeletal : Fair range of motion, no joint swelling or effusion, muscle tone and power appears normal.   Skin : Moist, warm, skin turgor, no pathological rash. Lymphatic : No cervical lymphadenopathy. Neurological : Awake, alert, oriented to time place person. No neurological deficits.   Psychiatric : Mood and affect appears appropriate to the situation.          Lab/Data Review:  Recent Results (from the past 24 hour(s))   MAGNESIUM    Collection Time: 11/06/21 11:40 AM   Result Value Ref Range    Magnesium 2.2 1.6 - 2.4 mg/dL   CK    Collection Time: 11/06/21 11:40 AM   Result Value Ref Range     26 - 192 U/L   RENAL FUNCTION PANEL    Collection Time: 11/06/21 11:40 AM   Result Value Ref Range    Sodium 133 (L) 136 - 145 mmol/L    Potassium 3.6 3.5 - 5.1 mmol/L    Chloride 98 97 - 108 mmol/L    CO2 25 21 - 32 mmol/L    Anion gap 10 5 - 15 mmol/L    Glucose 106 (H) 65 - 100 mg/dL    BUN 9 6 - 20 mg/dL    Creatinine 0.55 0.55 - 1.02 mg/dL    BUN/Creatinine ratio 16 12 - 20      GFR est AA >60 >60 ml/min/1.73m2    GFR est non-AA >60 >60 ml/min/1.73m2    Calcium 9.1 8.5 - 10.1 mg/dL    Phosphorus 4.0 2.6 - 4.7 mg/dL    Albumin 3.4 (L) 3.5 - 5.0 g/dL   TSH 3RD GENERATION    Collection Time: 11/06/21 11:40 AM   Result Value Ref Range    TSH 2.24 0.36 - 3.74 uIU/mL   URIC ACID    Collection Time: 11/06/21 11:40 AM   Result Value Ref Range    Uric acid 6.3 (H) 2.6 - 6.0 mg/dL   VITAMIN D, 25 HYDROXY    Collection Time: 11/06/21 11:40 AM   Result Value Ref Range    Vitamin D 25-Hydroxy 10.8 (L) 30 - 100 ng/mL   LIPID PANEL    Collection Time: 11/06/21 11:40 AM   Result Value Ref Range    LIPID PROFILE        Cholesterol, total 176 <200 mg/dL    Triglyceride 203 (H) <150 mg/dL    HDL Cholesterol 31 mg/dL    LDL, calculated 104.4 (H) 0 - 100 mg/dL    VLDL, calculated 40.6 mg/dL    CHOL/HDL Ratio 5.7 (H) 0.0 - 5.0     CBC WITH AUTOMATED DIFF    Collection Time: 11/06/21 11:40 AM   Result Value Ref Range    WBC 9.1 3.6 - 11.0 K/uL    RBC 3.75 (L) 3.80 - 5.20 M/uL    HGB 12.8 11.5 - 16.0 g/dL    HCT 37.0 35.0 - 47.0 %    MCV 98.7 80.0 - 99.0 FL    MCH 34.1 (H) 26.0 - 34.0 PG    MCHC 34.6 30.0 - 36.5 g/dL    RDW 13.1 11.5 - 14.5 %    PLATELET 869 501 - 643 K/uL    MPV 11.3 8.9 - 12.9 FL    NRBC 0.0 0.0  WBC    ABSOLUTE NRBC 0.00 0.00 - 0.01 K/uL    NEUTROPHILS 69 32 - 75 %    LYMPHOCYTES 15 12 - 49 %    MONOCYTES 13 5 - 13 %    EOSINOPHILS 1 0 - 7 %    BASOPHILS 1 0 - 1 %    IMMATURE GRANULOCYTES 1 (H) 0 - 0.5 %    ABS. NEUTROPHILS 6.5 1.8 - 8.0 K/UL    ABS. LYMPHOCYTES 1.3 0.8 - 3.5 K/UL    ABS. MONOCYTES 1.2 (H) 0.0 - 1.0 K/UL    ABS. EOSINOPHILS 0.1 0.0 - 0.4 K/UL    ABS. BASOPHILS 0.1 0.0 - 0.1 K/UL    ABS. IMM.  GRANS. 0.1 (H) 0.00 - 0.04 K/UL    DF AUTOMATED     METABOLIC PANEL, BASIC    Collection Time: 11/06/21 11:40 AM   Result Value Ref Range    Sodium 131 (L) 136 - 145 mmol/L    Potassium 3.7 3.5 - 5.1 mmol/L    Chloride 98 97 - 108 mmol/L    CO2 25 21 - 32 mmol/L    Anion gap 8 5 - 15 mmol/L    Glucose 107 (H) 65 - 100 mg/dL    BUN 9 6 - 20 mg/dL    Creatinine 0.55 0.55 - 1.02 mg/dL    BUN/Creatinine ratio 16 12 - 20      GFR est AA >60 >60 ml/min/1.73m2    GFR est non-AA >60 >60 ml/min/1.73m2    Calcium 9.0 8.5 - 10.1 mg/dL   MAGNESIUM    Collection Time: 11/06/21 11:40 AM   Result Value Ref Range    Magnesium 2.2 1.6 - 2.4 mg/dL   TROPONIN-HIGH SENSITIVITY    Collection Time: 11/06/21  1:10 PM   Result Value Ref Range    Troponin-High Sensitivity 510 (HH) 0 - 51 ng/L   ECHO ADULT COMPLETE    Collection Time: 11/06/21  4:50 PM   Result Value Ref Range    LV ED Vol A2C 14.00 cm3    LV ED Vol A2C 77.90 cm3    LV ES Vol A2C 27.50 cm3    IVSd 1.92 (A) 0.6 - 0.9 cm    LVIDd 4.27 3.9 - 5.3 cm    LVIDs 3.02 cm    LVOT d 2.00 cm    Left Ventricular Outflow Tract Mean Gradient 1.00 mmHg    LVOT VTI 12.10 cm    LVOT Peak Velocity 66.80 cm/s    LVOT Peak Gradient 2.00 mmHg    LVPWd 1.13 (A) 0.6 - 0.9 cm    LV E' Septal Velocity 4.37 cm/s    E/E' septal 10.62     LVOT SV 38.0 cm3    LA Area 2C 17.40 cm2    Left Atrium Major Axis 5.90 cm    Left Atrium Major Axis 5.90 cm    Aortic Valve Systolic Mean Gradient 1.05 mmHg    AoV VTI 15.60 cm    Aortic valve mean velocity 65.20 cm/s    Aortic Valve Systolic Peak Velocity 58.29 cm/s    AoV PG 4.00 mmHg    Aortic Valve Area by Continuity of VTI 2.44 cm2    Aortic Valve Area by Continuity of Peak Velocity 2.17 cm2    Ao Root D 3.20 cm    AO ASC D 2.70 cm    TV MG 53.00 mmHg    Mitral Regurgitant Velocity Time Integral 119.00 cm    Mitral Valve Deceleration Sublette 3,330.00 mm/s2    Mitral Valve Deceleration Sublette 6,040.00 mm/s2    Mitral Valve Deceleration Sublette 4,690.00 mm/s2    Mitral Valve Pressure Half-time 33.00 ms    MV A Jay 81.20 cm/s    MV E Jay 46.40 cm/s    MVA (PHT) 6.67 cm2    MV E/A 0.57     Pulmonic Valve Systolic Mean Gradient 8.66 mmHg    Pulmonic Valve Systolic Velocity Time Integral 16.40 cm    Pulmonic Valve Max Velocity 75.20 cm/s    Pulmonic Valve Systolic Peak Instantaneous Gradient 2.00 mmHg    Tricuspid Valve Max Velocity 267.00 cm/s    Triscuspid Valve Regurgitation Peak Gradient 29.00 mmHg    LV Mass .2 67 - 162 g    LV Mass AL Index 171.3 43 - 95 g/m2    REAL/BSA Pk Jay 1.4 cm2/m2    REAL/BSA VTI 1.6 cm2/m2   CBC WITH AUTOMATED DIFF    Collection Time: 11/07/21  7:20 AM   Result Value Ref Range    WBC 8.7 3.6 - 11.0 K/uL    RBC 3.86 3.80 - 5.20 M/uL    HGB 12.9 11.5 - 16.0 g/dL    HCT 38.1 35.0 - 47.0 %    MCV 98.7 80.0 - 99.0 FL    MCH 33.4 26.0 - 34.0 PG    MCHC 33.9 30.0 - 36.5 g/dL    RDW 13.2 11.5 - 14.5 %    PLATELET 789 850 - 784 K/uL    MPV 11.3 8.9 - 12.9 FL    NRBC 0.0 0.0  WBC    ABSOLUTE NRBC 0.00 0.00 - 0.01 K/uL    NEUTROPHILS 69 32 - 75 %    LYMPHOCYTES 15 12 - 49 %    MONOCYTES 13 5 - 13 %    EOSINOPHILS 1 0 - 7 %    BASOPHILS 1 0 - 1 %    IMMATURE GRANULOCYTES 1 (H) 0 - 0.5 %    ABS. NEUTROPHILS 6.1 1.8 - 8.0 K/UL    ABS. LYMPHOCYTES 1.3 0.8 - 3.5 K/UL    ABS. MONOCYTES 1.2 (H) 0.0 - 1.0 K/UL    ABS. EOSINOPHILS 0.1 0.0 - 0.4 K/UL    ABS. BASOPHILS 0.1 0.0 - 0.1 K/UL    ABS. IMM. GRANS. 0.0 0.00 - 0.04 K/UL    DF AUTOMATED     METABOLIC PANEL, BASIC    Collection Time: 11/07/21  7:20 AM   Result Value Ref Range    Sodium 133 (L) 136 - 145 mmol/L    Potassium 3.5 3.5 - 5.1 mmol/L    Chloride 101 97 - 108 mmol/L    CO2 25 21 - 32 mmol/L    Anion gap 7 5 - 15 mmol/L    Glucose 101 (H) 65 - 100 mg/dL    BUN 6 6 - 20 mg/dL    Creatinine 0.54 (L) 0.55 - 1.02 mg/dL    BUN/Creatinine ratio 11 (L) 12 - 20      GFR est AA >60 >60 ml/min/1.73m2    GFR est non-AA >60 >60 ml/min/1.73m2    Calcium 8.9 8.5 - 10.1 mg/dL   MAGNESIUM    Collection Time: 11/07/21  7:20 AM   Result Value Ref Range    Magnesium 1.8 1.6 - 2.4 mg/dL         Assessment and plan:      (1) Afib with RVR: on PO metoprolol 150 PO, eliauis, PRN metoprolol IV. She has EP in [place]    (2) Congenital HOCM: complicates #1. (3) hypomagnesemia and low vitamin D: held protonix.  IV replaced and vitamin D supplements started    (4) GERD: famotidine and tums.     DVT ppx: eliquis    DISPO: home when ok with cards, likely tomorrow    Lives in [place], with , they are visiting places.      Signed By: Eloina Fortune MD     November 7, 2021

## 2021-11-07 NOTE — PROGRESS NOTES
Cardiology Progress Note      11/7/2021 1:46 PM    Admit Date: 11/5/2021    Admit Diagnosis: Atrial fibrillation (HCC) [I48.91]  Hypertrophic obstructive cardiomyopathy, congenital [Q24.8]      Subjective:   Patient seen and examined. Heart rate is poorly controlled today. She has not slept well and is significantly anxious as well. Telemetry shows atrial fibrillation with heart rate in the 130s to 140s. She is off Cardizem infusion. Visit Vitals  BP 91/68 (BP 1 Location: Left upper arm, BP Patient Position: Semi fowlers)   Pulse 60   Temp 97.4 °F (36.3 °C)   Resp 22   Ht 5' 1\" (1.549 m)   Wt 55 kg (121 lb 4.1 oz)   SpO2 94%   BMI 22.91 kg/m²     Current Facility-Administered Medications   Medication Dose Route Frequency    famotidine (PEPCID) tablet 20 mg  20 mg Oral DAILY    cholecalciferol (VITAMIN D3) (5000 Units/125 mcg) tablet 5,000 Units  5,000 Units Oral DAILY    LORazepam (ATIVAN) tablet 1 mg  1 mg Oral Q6H PRN    melatonin tablet 5 mg  5 mg Oral QHS PRN    metoprolol succinate (TOPROL-XL) XL tablet 150 mg  150 mg Oral DAILY    metoprolol (LOPRESSOR) injection 5 mg  5 mg IntraVENous Q4H PRN    dilTIAZem (CARDIZEM) 125 mg/125 mL (1 mg/mL) dextrose 5% infusion  0-15 mg/hr IntraVENous TITRATE    apixaban (ELIQUIS) tablet 5 mg  5 mg Oral BID    sodium chloride (NS) flush 5-40 mL  5-40 mL IntraVENous Q8H    sodium chloride (NS) flush 5-40 mL  5-40 mL IntraVENous PRN    acetaminophen (TYLENOL) tablet 650 mg  650 mg Oral Q4H PRN         Objective:      Physical Exam:  Visit Vitals  BP 91/68 (BP 1 Location: Left upper arm, BP Patient Position: Semi fowlers)   Pulse 60   Temp 97.4 °F (36.3 °C)   Resp 22   Ht 5' 1\" (1.549 m)   Wt 55 kg (121 lb 4.1 oz)   SpO2 94%   BMI 22.91 kg/m²     General Appearance:  Well developed, well nourished,alert and oriented x 3, and individual in no acute distress. Ears/Nose/Mouth/Throat:   Hearing grossly normal.         Neck: Supple.    Chest:   Lungs clear to auscultation bilaterally. Cardiovascular:   Irregularly irregular, S1, S2 normal, no murmur. Abdomen:   Soft, non-tender, bowel sounds are active. Extremities: No edema bilaterally. Skin: Warm and dry.                Data Review:   Labs:    Recent Results (from the past 24 hour(s))   ECHO ADULT COMPLETE    Collection Time: 11/06/21  4:50 PM   Result Value Ref Range    LV ED Vol A2C 14.00 cm3    LV ED Vol A2C 77.90 cm3    LV ES Vol A2C 27.50 cm3    IVSd 1.92 (A) 0.6 - 0.9 cm    LVIDd 4.27 3.9 - 5.3 cm    LVIDs 3.02 cm    LVOT d 2.00 cm    Left Ventricular Outflow Tract Mean Gradient 1.00 mmHg    LVOT VTI 12.10 cm    LVOT Peak Velocity 66.80 cm/s    LVOT Peak Gradient 2.00 mmHg    LVPWd 1.13 (A) 0.6 - 0.9 cm    LV E' Septal Velocity 4.37 cm/s    E/E' septal 10.62     LVOT SV 38.0 cm3    LA Area 2C 17.40 cm2    Left Atrium Major Axis 5.90 cm    Left Atrium Major Axis 5.90 cm    Aortic Valve Systolic Mean Gradient 5.74 mmHg    AoV VTI 15.60 cm    Aortic valve mean velocity 65.20 cm/s    Aortic Valve Systolic Peak Velocity 91.83 cm/s    AoV PG 4.00 mmHg    Aortic Valve Area by Continuity of VTI 2.44 cm2    Aortic Valve Area by Continuity of Peak Velocity 2.17 cm2    Ao Root D 3.20 cm    AO ASC D 2.70 cm    TV MG 53.00 mmHg    Mitral Regurgitant Velocity Time Integral 119.00 cm    Mitral Valve Deceleration Anderson 3,330.00 mm/s2    Mitral Valve Deceleration Anderson 6,040.00 mm/s2    Mitral Valve Deceleration Anderson 4,690.00 mm/s2    Mitral Valve Pressure Half-time 33.00 ms    MV A Jay 81.20 cm/s    MV E Jay 46.40 cm/s    MVA (PHT) 6.67 cm2    MV E/A 0.57     Pulmonic Valve Systolic Mean Gradient 9.39 mmHg    Pulmonic Valve Systolic Velocity Time Integral 16.40 cm    Pulmonic Valve Max Velocity 75.20 cm/s    Pulmonic Valve Systolic Peak Instantaneous Gradient 2.00 mmHg    Tricuspid Valve Max Velocity 267.00 cm/s    Triscuspid Valve Regurgitation Peak Gradient 29.00 mmHg    LV Mass .2 67 - 162 g    LV Mass AL Index 171.3 43 - 95 g/m2    REAL/BSA Pk Jay 1.4 cm2/m2    REAL/BSA VTI 1.6 cm2/m2   CBC WITH AUTOMATED DIFF    Collection Time: 11/07/21  7:20 AM   Result Value Ref Range    WBC 8.7 3.6 - 11.0 K/uL    RBC 3.86 3.80 - 5.20 M/uL    HGB 12.9 11.5 - 16.0 g/dL    HCT 38.1 35.0 - 47.0 %    MCV 98.7 80.0 - 99.0 FL    MCH 33.4 26.0 - 34.0 PG    MCHC 33.9 30.0 - 36.5 g/dL    RDW 13.2 11.5 - 14.5 %    PLATELET 425 974 - 978 K/uL    MPV 11.3 8.9 - 12.9 FL    NRBC 0.0 0.0  WBC    ABSOLUTE NRBC 0.00 0.00 - 0.01 K/uL    NEUTROPHILS 69 32 - 75 %    LYMPHOCYTES 15 12 - 49 %    MONOCYTES 13 5 - 13 %    EOSINOPHILS 1 0 - 7 %    BASOPHILS 1 0 - 1 %    IMMATURE GRANULOCYTES 1 (H) 0 - 0.5 %    ABS. NEUTROPHILS 6.1 1.8 - 8.0 K/UL    ABS. LYMPHOCYTES 1.3 0.8 - 3.5 K/UL    ABS. MONOCYTES 1.2 (H) 0.0 - 1.0 K/UL    ABS. EOSINOPHILS 0.1 0.0 - 0.4 K/UL    ABS. BASOPHILS 0.1 0.0 - 0.1 K/UL    ABS. IMM. GRANS. 0.0 0.00 - 0.04 K/UL    DF AUTOMATED     METABOLIC PANEL, BASIC    Collection Time: 11/07/21  7:20 AM   Result Value Ref Range    Sodium 133 (L) 136 - 145 mmol/L    Potassium 3.5 3.5 - 5.1 mmol/L    Chloride 101 97 - 108 mmol/L    CO2 25 21 - 32 mmol/L    Anion gap 7 5 - 15 mmol/L    Glucose 101 (H) 65 - 100 mg/dL    BUN 6 6 - 20 mg/dL    Creatinine 0.54 (L) 0.55 - 1.02 mg/dL    BUN/Creatinine ratio 11 (L) 12 - 20      GFR est AA >60 >60 ml/min/1.73m2    GFR est non-AA >60 >60 ml/min/1.73m2    Calcium 8.9 8.5 - 10.1 mg/dL   MAGNESIUM    Collection Time: 11/07/21  7:20 AM   Result Value Ref Range    Magnesium 1.8 1.6 - 2.4 mg/dL       Telemetry: AFIB      Assessment:   Atrial fibrillation with RVR  Elevated troponins  Hypertrophic obstructive cardiomyopathy  Status post BiV ICD    Plan:   -Continue Toprol- mg p.o. daily. Keep K above 4, mag above 2. Patient has never been on propafenone in the past however she has been on sotalol which she abruptly stopped about 2 years ago due to losing insurance.   She follows with electrophysiology in [place] where she resides. For now, I would give her IV Lopressor as needed to optimize her rate control and continue Toprol-XL at 150 mg p.o. daily  -Significantly elevated troponins with flat trend likely non-MI related secondary to tachycardia in the setting of underlying hypertrophic myocardium. This is not consistent with acute coronary syndrome  -Echo shows normal LVEF with no significant LVOT obstruction  -Manage anxiety as per primary team plan which may also contribute to tachycardia  -If she remains tachycardic, will keep her n.p.o. after midnight for possible MANDEEP guided cardioversion tomorrow. However, this is complicated by her history of 'problem with esophageal sphincter' patient knows further details and no prior records are available. We will tentatively keep her n.p.o. in case she fails medical therapy. Discussed with primary team.  Thank you for allowing us to participate in the care of your patient. ADDENDUM:  Discussed case with Dr. Emi Klein EP. Given GI history without prior records being available, will get a CTA Chest to rule out intracardiac thrombus. If this is negative, he will proceed with DC cardioversion in the morning. I have asked Dr. Emi Klein to see the patient as well for any other recommendations he may have. Keep NPO after midnight.

## 2021-11-07 NOTE — PROGRESS NOTES
Problem: Falls - Risk of  Goal: *Absence of Falls  Description: Document Damari Tatumuro Fall Risk and appropriate interventions in the flowsheet.   Outcome: Progressing Towards Goal  Note: Fall Risk Interventions:            Medication Interventions: Teach patient to arise slowly                   Problem: Patient Education: Go to Patient Education Activity  Goal: Patient/Family Education  Outcome: Progressing Towards Goal

## 2021-11-08 ENCOUNTER — APPOINTMENT (OUTPATIENT)
Dept: CT IMAGING | Age: 51
DRG: 291 | End: 2021-11-08
Attending: INTERNAL MEDICINE
Payer: COMMERCIAL

## 2021-11-08 LAB
ANION GAP SERPL CALC-SCNC: 11 MMOL/L (ref 5–15)
ATRIAL RATE: 159 BPM
ATRIAL RATE: 278 BPM
BASOPHILS # BLD: 0.1 K/UL (ref 0–0.1)
BASOPHILS NFR BLD: 1 % (ref 0–1)
BUN SERPL-MCNC: 9 MG/DL (ref 6–20)
BUN/CREAT SERPL: 15 (ref 12–20)
CA-I BLD-MCNC: 8.8 MG/DL (ref 8.5–10.1)
CALCULATED R AXIS, ECG10: 105 DEGREES
CALCULATED R AXIS, ECG10: 120 DEGREES
CALCULATED T AXIS, ECG11: -67 DEGREES
CALCULATED T AXIS, ECG11: -90 DEGREES
CHLORIDE SERPL-SCNC: 101 MMOL/L (ref 97–108)
CO2 SERPL-SCNC: 22 MMOL/L (ref 21–32)
CREAT SERPL-MCNC: 0.6 MG/DL (ref 0.55–1.02)
DIAGNOSIS, 93000: NORMAL
DIAGNOSIS, 93000: NORMAL
DIFFERENTIAL METHOD BLD: ABNORMAL
EOSINOPHIL # BLD: 0.1 K/UL (ref 0–0.4)
EOSINOPHIL NFR BLD: 1 % (ref 0–7)
ERYTHROCYTE [DISTWIDTH] IN BLOOD BY AUTOMATED COUNT: 13.4 % (ref 11.5–14.5)
GLUCOSE SERPL-MCNC: 85 MG/DL (ref 65–100)
HCT VFR BLD AUTO: 37.6 % (ref 35–47)
HGB BLD-MCNC: 12.8 G/DL (ref 11.5–16)
IMM GRANULOCYTES # BLD AUTO: 0.1 K/UL (ref 0–0.04)
IMM GRANULOCYTES NFR BLD AUTO: 1 % (ref 0–0.5)
LYMPHOCYTES # BLD: 2 K/UL (ref 0.8–3.5)
LYMPHOCYTES NFR BLD: 22 % (ref 12–49)
MAGNESIUM SERPL-MCNC: 2 MG/DL (ref 1.6–2.4)
MCH RBC QN AUTO: 33.7 PG (ref 26–34)
MCHC RBC AUTO-ENTMCNC: 34 G/DL (ref 30–36.5)
MCV RBC AUTO: 98.9 FL (ref 80–99)
MONOCYTES # BLD: 1.2 K/UL (ref 0–1)
MONOCYTES NFR BLD: 13 % (ref 5–13)
NEUTS SEG # BLD: 5.8 K/UL (ref 1.8–8)
NEUTS SEG NFR BLD: 62 % (ref 32–75)
NRBC # BLD: 0 K/UL (ref 0–0.01)
NRBC BLD-RTO: 0 PER 100 WBC
PLATELET # BLD AUTO: 372 K/UL (ref 150–400)
PMV BLD AUTO: 11.4 FL (ref 8.9–12.9)
POTASSIUM SERPL-SCNC: 4.2 MMOL/L (ref 3.5–5.1)
Q-T INTERVAL, ECG07: 350 MS
Q-T INTERVAL, ECG07: 410 MS
QRS DURATION, ECG06: 134 MS
QRS DURATION, ECG06: 142 MS
QTC CALCULATION (BEZET), ECG08: 544 MS
QTC CALCULATION (BEZET), ECG08: 558 MS
RBC # BLD AUTO: 3.8 M/UL (ref 3.8–5.2)
SODIUM SERPL-SCNC: 134 MMOL/L (ref 136–145)
VENTRICULAR RATE, ECG03: 106 BPM
VENTRICULAR RATE, ECG03: 153 BPM
WBC # BLD AUTO: 9.2 K/UL (ref 3.6–11)

## 2021-11-08 PROCEDURE — 71275 CT ANGIOGRAPHY CHEST: CPT

## 2021-11-08 PROCEDURE — 74011250636 HC RX REV CODE- 250/636: Performed by: INTERNAL MEDICINE

## 2021-11-08 PROCEDURE — 80048 BASIC METABOLIC PNL TOTAL CA: CPT

## 2021-11-08 PROCEDURE — 36415 COLL VENOUS BLD VENIPUNCTURE: CPT

## 2021-11-08 PROCEDURE — 93005 ELECTROCARDIOGRAM TRACING: CPT

## 2021-11-08 PROCEDURE — 74011000250 HC RX REV CODE- 250: Performed by: INTERNAL MEDICINE

## 2021-11-08 PROCEDURE — 85025 COMPLETE CBC W/AUTO DIFF WBC: CPT

## 2021-11-08 PROCEDURE — 65270000029 HC RM PRIVATE

## 2021-11-08 PROCEDURE — 74011250637 HC RX REV CODE- 250/637: Performed by: HOSPITALIST

## 2021-11-08 PROCEDURE — 74011000636 HC RX REV CODE- 636: Performed by: INTERNAL MEDICINE

## 2021-11-08 PROCEDURE — 83735 ASSAY OF MAGNESIUM: CPT

## 2021-11-08 RX ORDER — SODIUM CHLORIDE 9 MG/ML
75 INJECTION, SOLUTION INTRAVENOUS CONTINUOUS
Status: CANCELLED | OUTPATIENT
Start: 2021-11-08

## 2021-11-08 RX ORDER — SODIUM CHLORIDE 0.9 % (FLUSH) 0.9 %
5-40 SYRINGE (ML) INJECTION EVERY 8 HOURS
Status: CANCELLED | OUTPATIENT
Start: 2021-11-08

## 2021-11-08 RX ORDER — FUROSEMIDE 10 MG/ML
40 INJECTION INTRAMUSCULAR; INTRAVENOUS EVERY 12 HOURS
Status: DISCONTINUED | OUTPATIENT
Start: 2021-11-08 | End: 2021-11-09

## 2021-11-08 RX ORDER — SODIUM CHLORIDE 0.9 % (FLUSH) 0.9 %
5-40 SYRINGE (ML) INJECTION AS NEEDED
Status: CANCELLED | OUTPATIENT
Start: 2021-11-08

## 2021-11-08 RX ADMIN — APIXABAN 5 MG: 5 TABLET, FILM COATED ORAL at 20:47

## 2021-11-08 RX ADMIN — METOPROLOL SUCCINATE 150 MG: 50 TABLET, EXTENDED RELEASE ORAL at 11:08

## 2021-11-08 RX ADMIN — Medication 10 ML: at 11:09

## 2021-11-08 RX ADMIN — IOPAMIDOL 100 ML: 755 INJECTION, SOLUTION INTRAVENOUS at 09:18

## 2021-11-08 RX ADMIN — Medication 10 ML: at 14:52

## 2021-11-08 RX ADMIN — Medication 5000 UNITS: at 11:04

## 2021-11-08 RX ADMIN — Medication 5 MG/HR: at 20:42

## 2021-11-08 RX ADMIN — Medication 10 ML: at 21:11

## 2021-11-08 RX ADMIN — FUROSEMIDE 40 MG: 10 INJECTION INTRAMUSCULAR; INTRAVENOUS at 11:04

## 2021-11-08 RX ADMIN — APIXABAN 5 MG: 5 TABLET, FILM COATED ORAL at 11:04

## 2021-11-08 RX ADMIN — LORAZEPAM 1 MG: 1 TABLET ORAL at 21:10

## 2021-11-08 RX ADMIN — FAMOTIDINE 20 MG: 20 TABLET, FILM COATED ORAL at 11:04

## 2021-11-08 NOTE — PROGRESS NOTES
Hospitalist Progress Note    NAME: Jessica Riojas   :  1970   MRN:  [id number]           Subjective:     Chief Complaint / Reason for Physician Visit  Patient seen and evaluated bedside, still complaining of shortness of breath with minimal improvement, currently still has complaints of palpitations. Discussed with RN events overnight. Review of Systems:  Symptom Y/N Comments  Symptom Y/N Comments   Fever/Chills N   Chest Pain N    Poor Appetite N   Edema N    Cough N   Abdominal Pain N    Sputum N   Joint Pain N    SOB/MENA Y   Pruritis/Rash N    Nausea/vomit N   Tolerating PT/OT NA    Diarrhea N   Tolerating Diet Y    Constipation N   Other       Could NOT obtain due to:    Patient denies any fevers chills nausea vomiting lightheadedness dizziness chest pain headache focal weakness loss of sensation auditory or visual symptoms abdominal stool or urinary complaints or any other associated symptoms. Objective:     VITALS:   Last 24hrs VS reviewed since prior progress note. Most recent are:  Patient Vitals for the past 24 hrs:   Temp Pulse Resp BP SpO2   21 0759 97.6 °F (36.4 °C) (!) 115 18 107/69 94 %   21 0603  100  91/66    21 0326 98 °F (36.7 °C) 96 29 (!) 93/59 98 %   21 2310 97.5 °F (36.4 °C) (!) 126 22 98/77 96 %   21 2309  (!) 145  101/70    21 2152  (!) 135  (!) 85/62 93 %   21 2050  (!) 140      21 1958 97.8 °F (36.6 °C) (!) 103 18 98/77 98 %   21 1649  (!) 152      21 1630 97.5 °F (36.4 °C) 69 17 (!) 102/55 93 %   21 1600  (!) 130      21 1428  82 20 91/60 98 %   21 1200  (!) 130      21 1137 97.4 °F (36.3 °C) 60 22 91/68 94 %   21 1103  (!) 160        No intake or output data in the 24 hours ending 21 0939     PHYSICAL EXAM:  General: Patient appears comfortable    EENT:  EOMI. Anicteric sclerae.  MMM  Resp:  Decreased air entry bilaterally with appreciable bilateral bibasilar crackles  CV:  Irregularly irregular, tachycardic, S1 plus S2, no murmurs rubs or gallops,  No edema  GI:  Soft, Non distended, Non tender. +Bowel sounds  Neurologic:  Alert and oriented X 3, normal speech,   Psych:   Good insight. Not anxious nor agitated  Skin:  No rashes. No jaundice    Procedures: see electronic medical records for all procedures/Xrays and details which were not copied into this note but were reviewed prior to creation of Plan. LABS:  I reviewed today's most current labs and imaging studies. Pertinent labs include:  Recent Labs     11/08/21  0700 11/07/21  0720 11/06/21  1140   WBC 9.2 8.7 9.1   HGB 12.8 12.9 12.8   HCT 37.6 38.1 37.0    361 343     Recent Labs     11/08/21  0700 11/07/21  0720 11/06/21  1140 11/05/21  2140 11/05/21  2140   * 133* 133*  131*   < > 128*   K 4.2 3.5 3.6  3.7   < > 4.1    101 98  98   < > 96*   CO2 22 25 25  25   < > 22   GLU 85 101* 106*  107*   < > 115*   BUN 9 6 9  9   < > 11   CREA 0.60 0.54* 0.55  0.55   < > 0.58   CA 8.8 8.9 9.1  9.0   < > 8.8   MG 2.0 1.8 2.2  2.2   < > 1.5*   PHOS  --   --  4.0  --   --    ALB  --   --  3.4*  --  3.2*   TBILI  --   --   --   --  0.4   ALT  --   --   --   --  26    < > = values in this interval not displayed. Signed: Desirae Pugh MD    X-ray chest:Findings/IMPRESSION: Cardiopericardial enlargement with right atrial and right  ventricular AICD leads. Central vascular congestion. Bilateral perihilar and  basilar airspace opacities, consistent with a combination of edema and  atelectasis, with associated small bilateral pleural effusions, with progression  of pleural/parenchymal opacities compared with previous. No pneumothorax or  other acute change. TTE:  · LV: Estimated LVEF is 50 - 55%. Normal cavity size and systolic function (ejection fraction normal). Septal asymmetric hypertrophy. No asymmetric gradient noted. Severe septal wall hypertrophy.  Mild posterior wall hypertrophy. Abnormal left ventricular septal motion consistent with post-operative state. Mild (grade 1) left ventricular diastolic dysfunction. The findings are consistent with hypertrophic cardiomyopathy without obstruction. · TV: Mild tricuspid valve regurgitation is present. Right Ventricular Arterial Pressure (RVSP) is 32 mmHg. · LA: Moderately dilated left atrium. Reviewed most current lab test results and cultures  YES  Reviewed most current radiology test results   YES  Review and summation of old records today    NO  Reviewed patient's current orders and MAR    YES  PMH/SH reviewed - no change compared to H&P      Assessment / Plan:  Acute respiratory failure with hypoxia secondary to acute decompensated heart failure with diastolic dysfunctionof note patient presents with above-mentioned symptomatology found to have acute respiratory failure with hypoxia requiring 2 L nasal cannula for ventilatory support  Continuous telemetry monitoring  Daily weights  Frequent intake and output monitoring  Start Lasix 40 mg IV twice daily  Transthoracic echo reviewed  Attempt to wean oxygen  Cardiology consult appreciated, continue to follow recommendations    Atrial fibrillation with RVRof note patient remains in atrial fibrillation with RVR, currently maxed out on Toprol, has been on anticoagulation, currently remains on Cardizem gtt., patient awaiting for results of CT angio chest to rule out intracardiac thrombus  Continue n.p.o.   If CT angio chest negative for intracardiac thrombus patient to undergo cardioversion later today  Continue current medications  Continue anticoagulation  Cardiology consult appreciated, continue to follow recommendations  Electrophysiology consult appreciated, continue to follow recommendations    Vitamin D deficiencycontinue current medications    ProphylaxisEliquis  FENn.p.o., replete potassium and magnesium  Full code, patient surrogate decision-maker is her , updated at bedside  Dispositionpending clinical improvement, likely discharge home tomorrow      18.5 - 24.9 Normal weight / Body mass index is 22.91 kg/m². Code status: Full  Prophylaxis: Eliquis  Recommended Disposition: Home w/Family     ________________________________________________________________________  Care Plan discussed with:    Comments   Patient x    Family      RN x    Care Manager x    Consultant  x                     x Multidiciplinary team rounds were held today with , nursing, pharmacist and clinical coordinator. Patient's plan of care was discussed; medications were reviewed and discharge planning was addressed.      ________________________________________________________________________  Total NON critical care TIME:  35   Minutes      Comments   >50% of visit spent in counseling and coordination of care xx    ________________________________________________________________________  Desirae Pugh MD

## 2021-11-08 NOTE — CONSULTS
CARDIAC ELECTROPHYSIOLOGY CONSULTATION    PATIENT NAME: Jessica Riojas  YOB: 1970  AGE: 45 y.o. GENDER: female      REASON FOR CONSULT: Atrial fibrillation    REQUESTING PROVIDER: Preethi Watson MD    CHIEF COMPLAINT:  Dyspnea and fatigue    HISTORY OF PRESENT ILLNESS:  Jessica Riojas is a 45 y.o.  female with past medical history significant for hypertrophic cardiomyopathy s/p ICD insertion, paroxysmal atrial fibrillation who was evaluated today due to atrial fibrillation. She is visiting from [place] where she has her usual care. She was admitted with symptoms of dyspnea, palpitation, fatigue and lightheadedness on 11/6/21. She was found to be in AF with RVR. Rate control has been difficult and EP was consulted for further evaluation. Records from hospital admission course thus far reviewed. She has had a history of using Sotalol but stopped in 2015 due to insurance related issues. AF was then seen in 2017 however was brief; hence rhythm control was not reattempted. She has from ICD standpoint received device therapy in Aug 2021 which was appropriate; she has however received inappropriate therapies in the past. On TTE her IV septum is >1.5 cm. She believes she has taken all doses of her anticoagulation but is not completely sure. She has difficulty with her esophageal stricture known previously; hence a CTA is ordered to r/o cardiac thrombus. PAST MEDICAL HISTORY:  Past Medical History:   Diagnosis Date    A-fib [facility])     Hypertrophic obstructive cardiomyopathy, congenital 11/5/2021       INPATIENT MEDICATIONS:  Home medications reviewed.     Current Outpatient Medications   Medication Instructions    Eliquis 5 mg, Oral, 2 TIMES DAILY    magnesium oxide (MAG-OX) 400 mg, Oral, DAILY, OTC     metoprolol succinate (TOPROL-XL) 100 mg, Oral, DAILY         ALLERGIES:  Allergies reviewed with the patient,  Allergies   Allergen Reactions    Antihistamines - Alkylamine Other (comments)     Rapid HR     . FAMILY HISTORY:    No known family history of sudden cardiac death, syncope, arrhythmias, pacemakers, or ICDs. Family History   Problem Relation Age of Onset    No Known Problems Mother     No Known Problems Father          SOCIAL HISTORY:    Social History     Tobacco Use    Smoking status: Former Smoker    Smokeless tobacco: Never Used    Tobacco comment: Quit recently   Substance Use Topics    Alcohol use: Not Currently    Drug use: Not Currently       REVIEW OF SYSTEMS:  Complete review of systems performed, pertinents noted above, all other systems are negative. PHYSICAL EXAMINATION:    Visit Vitals  /69 (BP 1 Location: Right upper arm, BP Patient Position: Sitting)   Pulse (!) 115   Temp 97.6 °F (36.4 °C)   Resp 18   Ht 5' 1\" (1.549 m)   Wt 55 kg (121 lb 4.1 oz)   SpO2 94%   BMI 22.91 kg/m²     General: awake, alert; appears to be of stated age; normal body habitus; non-toxic appearing; in no acute distress; cooperative and responding appropriately to questions; comfortable lying in hospital bed  HEENT: conjunctivae not injected; sclera anicteric; mucous membranes moist  Neck: supple; no JVD  Heart: irregular rhythm; normal S1 and S2 heart sounds; no murmurs/rubs/gallops or ejection clicks appreciated  Lungs: clear breath sounds bilaterally; no wheezing/rales/rhonchi or pleural rubs appreciated; unlabored effort of breathing  Abdomen: soft, non-tender, non-distended; active bowel sounds present; no hepatomegaly appreciated  Extremities: warm and well perfused x 4; no gross deformities; no pitting pedal/pretibial edema bilaterally; radial and pedal pulses 2+ bilaterally  Skin: normal skin color, texture, and turgor; no lesions or rashes, no petechiae or purpura; no cyanosis; no clubbing of the fingernails  Neurologic: no gross focal deficits        Recent labs results and imaging reviewed.   Notable findings include   Lab Results   Component Value Date/Time WBC 9.2 11/08/2021 07:00 AM    HGB 12.8 11/08/2021 07:00 AM    HCT 37.6 11/08/2021 07:00 AM    PLATELET 396 67/90/6862 07:00 AM    MCV 98.9 11/08/2021 07:00 AM     Lab Results   Component Value Date/Time    GFR est non-AA >60 11/08/2021 07:00 AM    GFR est AA >60 11/08/2021 07:00 AM    Creatinine 0.60 11/08/2021 07:00 AM    BUN 9 11/08/2021 07:00 AM    Sodium 134 (L) 11/08/2021 07:00 AM    Potassium 4.2 11/08/2021 07:00 AM    Chloride 101 11/08/2021 07:00 AM    CO2 22 11/08/2021 07:00 AM    Magnesium 2.0 11/08/2021 07:00 AM    Phosphorus 4.0 11/06/2021 11:40 AM    .      Telemetry review:Atrial fibrillation rats 100s-120s    IMPRESSION AND RECOMMENDATIONS:  1. Paroxysmal atrial fibrillation  2. Hypertrophic cardiomyopathy  3. S/p ICD insertion    - I think the best way to proceed is to perform DC Cardioversion to restore sinus rhythm. - I discussed that cardioversion would be temporary and she would need some better rhythm control medications. Due to her septal thickness, I am not in favor of using a Class Ic agent for rhythm control (Flecainide or Propafenone) due to significant structural heart disease being a CI for these medications. There is the option for Class III agents but that does require loading. Patient would also if possible avoid Sotalol, as she feels the Metoprolol has helped her significantly from the HCM perspective. - I discussed the procedure indications, alternatives, details and risks. If CTA negative for thrombus then we may proceed with cardioversion; otherwise she would need a MANDEEP/Cardioversion. Thank you for involving us in the care of this patient. Please do not hesitate to call if additional questions arise.     Cole Espinosa MD  11/8/2021

## 2021-11-08 NOTE — PROGRESS NOTES
Problem: Falls - Risk of  Goal: *Absence of Falls  Description: Document Jina Blood Fall Risk and appropriate interventions in the flowsheet.   Outcome: Progressing Towards Goal  Note: Fall Risk Interventions:            Medication Interventions: Assess postural VS orthostatic hypotension, Bed/chair exit alarm, Teach patient to arise slowly                   Problem: Patient Education: Go to Patient Education Activity  Goal: Patient/Family Education  Outcome: Progressing Towards Goal

## 2021-11-08 NOTE — PROGRESS NOTES
Cardiology Progress Note      11/8/2021 1:46 PM    Admit Date: 11/5/2021    Admit Diagnosis: Atrial fibrillation (HCC) [I48.91]  Hypertrophic obstructive cardiomyopathy, congenital [Q24.8]      Subjective:   Patient seen and examined. Heart rate is poorly controlled today. She has not slept well and is significantly anxious as well. Telemetry shows atrial flutter with heart rate in the 130s to 140s. Started back on Cardizem infusion last night due to poorly controlled heart rates. She has no complaints. Visit Vitals  /69 (BP 1 Location: Right upper arm, BP Patient Position: Sitting)   Pulse (!) 115   Temp 97.6 °F (36.4 °C)   Resp 18   Ht 5' 1\" (1.549 m)   Wt 55 kg (121 lb 4.1 oz)   SpO2 94%   BMI 22.91 kg/m²     Current Facility-Administered Medications   Medication Dose Route Frequency    famotidine (PEPCID) tablet 20 mg  20 mg Oral DAILY    cholecalciferol (VITAMIN D3) (5000 Units/125 mcg) tablet 5,000 Units  5,000 Units Oral DAILY    LORazepam (ATIVAN) tablet 1 mg  1 mg Oral Q6H PRN    melatonin tablet 5 mg  5 mg Oral QHS PRN    dilTIAZem (CARDIZEM) 125 mg/125 mL (1 mg/mL) dextrose 5% infusion  5 mg/hr IntraVENous CONTINUOUS    metoprolol succinate (TOPROL-XL) XL tablet 150 mg  150 mg Oral DAILY    metoprolol (LOPRESSOR) injection 5 mg  5 mg IntraVENous Q4H PRN    apixaban (ELIQUIS) tablet 5 mg  5 mg Oral BID    sodium chloride (NS) flush 5-40 mL  5-40 mL IntraVENous Q8H    sodium chloride (NS) flush 5-40 mL  5-40 mL IntraVENous PRN    acetaminophen (TYLENOL) tablet 650 mg  650 mg Oral Q4H PRN         Objective:      Physical Exam:  Visit Vitals  /69 (BP 1 Location: Right upper arm, BP Patient Position: Sitting)   Pulse (!) 115   Temp 97.6 °F (36.4 °C)   Resp 18   Ht 5' 1\" (1.549 m)   Wt 55 kg (121 lb 4.1 oz)   SpO2 94%   BMI 22.91 kg/m²     General Appearance:  Well developed, well nourished,alert and oriented x 3, and individual in no acute distress.    Ears/Nose/Mouth/Throat: Hearing grossly normal.         Neck: Supple. Chest:   Lungs clear to auscultation bilaterally. Cardiovascular:   Irregularly irregular, S1, S2 normal, no murmur. Abdomen:   Soft, non-tender, bowel sounds are active. Extremities: No edema bilaterally. Skin: Warm and dry. Data Review:   Labs:    Recent Results (from the past 24 hour(s))   CBC WITH AUTOMATED DIFF    Collection Time: 11/08/21  7:00 AM   Result Value Ref Range    WBC 9.2 3.6 - 11.0 K/uL    RBC 3.80 3.80 - 5.20 M/uL    HGB 12.8 11.5 - 16.0 g/dL    HCT 37.6 35.0 - 47.0 %    MCV 98.9 80.0 - 99.0 FL    MCH 33.7 26.0 - 34.0 PG    MCHC 34.0 30.0 - 36.5 g/dL    RDW 13.4 11.5 - 14.5 %    PLATELET 683 437 - 091 K/uL    MPV 11.4 8.9 - 12.9 FL    NRBC 0.0 0.0  WBC    ABSOLUTE NRBC 0.00 0.00 - 0.01 K/uL    NEUTROPHILS 62 32 - 75 %    LYMPHOCYTES 22 12 - 49 %    MONOCYTES 13 5 - 13 %    EOSINOPHILS 1 0 - 7 %    BASOPHILS 1 0 - 1 %    IMMATURE GRANULOCYTES 1 (H) 0 - 0.5 %    ABS. NEUTROPHILS 5.8 1.8 - 8.0 K/UL    ABS. LYMPHOCYTES 2.0 0.8 - 3.5 K/UL    ABS. MONOCYTES 1.2 (H) 0.0 - 1.0 K/UL    ABS. EOSINOPHILS 0.1 0.0 - 0.4 K/UL    ABS. BASOPHILS 0.1 0.0 - 0.1 K/UL    ABS. IMM.  GRANS. 0.1 (H) 0.00 - 0.04 K/UL    DF AUTOMATED     METABOLIC PANEL, BASIC    Collection Time: 11/08/21  7:00 AM   Result Value Ref Range    Sodium 134 (L) 136 - 145 mmol/L    Potassium 4.2 3.5 - 5.1 mmol/L    Chloride 101 97 - 108 mmol/L    CO2 22 21 - 32 mmol/L    Anion gap 11 5 - 15 mmol/L    Glucose 85 65 - 100 mg/dL    BUN 9 6 - 20 mg/dL    Creatinine 0.60 0.55 - 1.02 mg/dL    BUN/Creatinine ratio 15 12 - 20      GFR est AA >60 >60 ml/min/1.73m2    GFR est non-AA >60 >60 ml/min/1.73m2    Calcium 8.8 8.5 - 10.1 mg/dL   MAGNESIUM    Collection Time: 11/08/21  7:00 AM   Result Value Ref Range    Magnesium 2.0 1.6 - 2.4 mg/dL       Telemetry: AFIB      Assessment:   Atrial fibrillation with RVR  Elevated troponins  Hypertrophic obstructive cardiomyopathy  Status post BiV ICD    Plan:   -Continue Toprol- mg p.o. daily. Keep K above 4, mag above 2. Patient has never been on propafenone in the past however she has been on sotalol which she abruptly stopped about 2 years ago due to losing insurance. She follows with electrophysiology in [place] where she resides. For now, I would give her IV Lopressor as needed to optimize her rate control and continue Toprol-XL at 150 mg p.o. daily  -Significantly elevated troponins with flat trend likely non-MI related secondary to tachycardia in the setting of underlying hypertrophic myocardium. This is not consistent with acute coronary syndrome  -Echo shows normal LVEF with no significant LVOT obstruction  -Manage anxiety as per primary team plan which may also contribute to tachycardia  -Discussed with electrophysiologist Dr. Niko Salinas overnight. Patient is to get a CT of the chest to rule out intracardiac thrombus. If this is negative, he will proceed with cardioversion later today. Patient agrees with the plan. Discussed with primary team.  Thank you for allowing us to participate in the care of your patient.

## 2021-11-08 NOTE — CONSULTS
Gastroenterology Consult     Referring Physician: None     Consult Date: 11/8/2021     Subjective:     Chief Complaint: Shortness of breath, gas, and nausea    History of Present Illness: Miguel A Matthews is a 45 y.o. female who is seen in consultation for history of esophageal sphincter problem rule out contraindication for MANDEEP. Patient states she has a past medical history significant for congenital hypertrophic obstructive cardiomyopathy s/p ICD insertion,and  paroxysmal atrial fibrillation. She is on Eliquis 5 mg twice daily. Her last dose of Eliquis was this morning. . She presented to the Emergency room with complaints of dyspnea, palpitations, fatigue, and light headedness. . She is in [place] visiting from [place]. She reports she started having shortness of breath that was increasing in severity to the point she was unable to function. She states she has been taking her medications as prescribed. She is on a beta blocker, magnesium, and Eliquis at home. On arrival to the ED she was found to be in atrial fibrillation with RVR. She was started on IV diltiazem. She was admitted and cardiology was consulted for further evaluation. She reports when she was a teenager when  she was diagnosed with an esophageal sphincter problem. She denies difficulty swallowing. She reports she has to be careful of how much food she eats because she feels full quickly. Her last EGD was at the age of 14. She reports she has had a barium swallow in the past. She denies nausea, vomiting, difficulty swallowing, diarrhea, or constipation. CTA of chest shows filling defect in the right atrial appendage suspicious for atrial appendage thrombus. Negative for PE. Plan for EGD in the am.    CTA Chest 11/8/2021: IMPRESSION  1.  14 mm filling defect in the right atrial appendage suspicious for atrial  appendage thrombus. Confirmation with echocardiography or cardiac MRI is recommended.   2.  Insufficient opacification of the left cardiac chambers to evaluate for left  sided intracardiac thrombus. Evaluation with echocardiography or cardiac MRI is recommended. 3.  Negative for pulmonary embolus. 4.  Cardiomegaly. 5.  Bilateral pleural effusions with adjacent bibasilar airspace  disease/atelectasis. 6.  Mild biapical interlobular septal thickening suggestive of trace  interstitial edema. 7.  A few small pulmonary nodules measuring up to 7 mm in the bilateral upper lobes may represent reactive intrapulmonary lymph nodes related to edema, however follow-up CT is warranted. Chest X-ray 11/7/21: IMPRESSION  Findings/IMPRESSION: Cardiopericardial enlargement with right atrial and right ventricular AICD leads. Central vascular congestion. Bilateral perihilar and basilar airspace opacities, consistent with a combination of edema and atelectasis, with associated small bilateral pleural effusions, with progression of pleural/parenchymal opacities compared with previous. No pneumothorax or other acute change. TTE:  · LV: Estimated LVEF is 50 - 55%. Normal cavity size and systolic function (ejection fraction normal). Septal asymmetric hypertrophy. No asymmetric gradient noted. Severe septal wall hypertrophy. Mild posterior wall hypertrophy. Abnormal left ventricular septal motion consistent with post-operative state. Mild (grade 1) left ventricular diastolic dysfunction. The findings are consistent with hypertrophic cardiomyopathy without obstruction. · TV: Mild tricuspid valve regurgitation is present. Right Ventricular Arterial Pressure (RVSP) is 32 mmHg. · LA: Moderately dilated left atrium.           Past Medical History:   Diagnosis Date    A-fib [facility])     Hypertrophic obstructive cardiomyopathy, congenital 11/5/2021     Past Surgical History:   Procedure Laterality Date    HX BIV-IMPLANTABLE CARDIOVERTER DEFIBRILLATOR        Family History   Problem Relation Age of Onset    No Known Problems Mother     No Known Problems Father Social History     Tobacco Use    Smoking status: Former Smoker    Smokeless tobacco: Never Used    Tobacco comment: Quit recently   Substance Use Topics    Alcohol use: Not Currently      Allergies   Allergen Reactions    Antihistamines - Alkylamine Other (comments)     Rapid HR      Current Facility-Administered Medications   Medication Dose Route Frequency    furosemide (LASIX) injection 40 mg  40 mg IntraVENous Q12H    famotidine (PEPCID) tablet 20 mg  20 mg Oral DAILY    cholecalciferol (VITAMIN D3) (5000 Units/125 mcg) tablet 5,000 Units  5,000 Units Oral DAILY    LORazepam (ATIVAN) tablet 1 mg  1 mg Oral Q6H PRN    melatonin tablet 5 mg  5 mg Oral QHS PRN    dilTIAZem (CARDIZEM) 125 mg/125 mL (1 mg/mL) dextrose 5% infusion  5 mg/hr IntraVENous CONTINUOUS    metoprolol succinate (TOPROL-XL) XL tablet 150 mg  150 mg Oral DAILY    metoprolol (LOPRESSOR) injection 5 mg  5 mg IntraVENous Q4H PRN    apixaban (ELIQUIS) tablet 5 mg  5 mg Oral BID    sodium chloride (NS) flush 5-40 mL  5-40 mL IntraVENous Q8H    sodium chloride (NS) flush 5-40 mL  5-40 mL IntraVENous PRN    acetaminophen (TYLENOL) tablet 650 mg  650 mg Oral Q4H PRN        Review of Systems:  A detailed 10 organ review of systems is obtained with pertinent positives as listed in the History of Present Illness and Past Medical History. All others are negative. Objective:     Physical Exam:  Visit Vitals  /73 (BP 1 Location: Left upper arm, BP Patient Position: Supine)   Pulse (!) 127   Temp 97.8 °F (36.6 °C)   Resp 18   Ht 5' 1\" (1.549 m)   Wt 55 kg (121 lb 4.1 oz)   SpO2 98%   BMI 22.91 kg/m²        Skin:  Extremities and face reveal no rashes. No moran erythema. No telangiectasias on the chest wall. HEENT: Sclerae anicteric. Extra-occular muscles are intact. No oral ulcers. No abnormal pigmentation of the lips. The neck is supple. Cardiovascular: Regular rate and rhythm.    Respiratory:  Comfortable breathing with no accessory muscle use. GI:  Abdomen nondistended, soft, and nontender. Normal active bowel sounds. No enlargement of the liver or spleen. No masses palpable. Rectal:  Deferred  Musculoskeletal: Extremities have good range of motion. Neurological:  Gross memory appears intact. Patient is alert and oriented. Psychiatric:  Mood appears appropriate with judgement intact. Lymphatic:  No cervical or supraclavicular adenopathy. Lab/Data Review:  CMP:   Lab Results   Component Value Date/Time     (L) 11/08/2021 07:00 AM    K 4.2 11/08/2021 07:00 AM     11/08/2021 07:00 AM    CO2 22 11/08/2021 07:00 AM    AGAP 11 11/08/2021 07:00 AM    GLU 85 11/08/2021 07:00 AM    BUN 9 11/08/2021 07:00 AM    CREA 0.60 11/08/2021 07:00 AM    GFRAA >60 11/08/2021 07:00 AM    GFRNA >60 11/08/2021 07:00 AM    CA 8.8 11/08/2021 07:00 AM    MG 2.0 11/08/2021 07:00 AM     CBC:   Lab Results   Component Value Date/Time    WBC 9.2 11/08/2021 07:00 AM    HGB 12.8 11/08/2021 07:00 AM    HCT 37.6 11/08/2021 07:00 AM     11/08/2021 07:00 AM         Assessment/Plan:     1. Esophageal sphincter problem rule out contraindication for MANDEEP.     EGD in the am     Continue current diet     NPO after midnight  2. Hypertrophic Obstructive Cardiomyopathy (congenital)      S/P ICD insertion     Cardiology input appreciated  3. Paroxysmal Atrial Fibrillation      Continue anticoagulation      Continue beta blockere    Thank you for allowing me to participate in this patients care  Plan discussed with Dr. Alana Mlcain and he approves.

## 2021-11-09 ENCOUNTER — APPOINTMENT (OUTPATIENT)
Dept: ENDOSCOPY | Age: 51
DRG: 291 | End: 2021-11-09
Attending: INTERNAL MEDICINE
Payer: COMMERCIAL

## 2021-11-09 LAB
ANION GAP SERPL CALC-SCNC: 7 MMOL/L (ref 5–15)
BUN SERPL-MCNC: 7 MG/DL (ref 6–20)
BUN/CREAT SERPL: 13 (ref 12–20)
CA-I BLD-MCNC: 8.8 MG/DL (ref 8.5–10.1)
CHLORIDE SERPL-SCNC: 104 MMOL/L (ref 97–108)
CO2 SERPL-SCNC: 25 MMOL/L (ref 21–32)
CREAT SERPL-MCNC: 0.55 MG/DL (ref 0.55–1.02)
ERYTHROCYTE [DISTWIDTH] IN BLOOD BY AUTOMATED COUNT: 13.3 % (ref 11.5–14.5)
GLUCOSE SERPL-MCNC: 102 MG/DL (ref 65–100)
HCT VFR BLD AUTO: 38.2 % (ref 35–47)
HGB BLD-MCNC: 13 G/DL (ref 11.5–16)
MAGNESIUM SERPL-MCNC: 1.7 MG/DL (ref 1.6–2.4)
MCH RBC QN AUTO: 33.6 PG (ref 26–34)
MCHC RBC AUTO-ENTMCNC: 34 G/DL (ref 30–36.5)
MCV RBC AUTO: 98.7 FL (ref 80–99)
NRBC # BLD: 0 K/UL (ref 0–0.01)
NRBC BLD-RTO: 0 PER 100 WBC
PLATELET # BLD AUTO: 389 K/UL (ref 150–400)
PMV BLD AUTO: 11 FL (ref 8.9–12.9)
POTASSIUM SERPL-SCNC: 3.4 MMOL/L (ref 3.5–5.1)
RBC # BLD AUTO: 3.87 M/UL (ref 3.8–5.2)
SODIUM SERPL-SCNC: 136 MMOL/L (ref 136–145)
WBC # BLD AUTO: 10.7 K/UL (ref 3.6–11)

## 2021-11-09 PROCEDURE — 65270000029 HC RM PRIVATE

## 2021-11-09 PROCEDURE — 80048 BASIC METABOLIC PNL TOTAL CA: CPT

## 2021-11-09 PROCEDURE — 74011250636 HC RX REV CODE- 250/636: Performed by: INTERNAL MEDICINE

## 2021-11-09 PROCEDURE — 74011250637 HC RX REV CODE- 250/637: Performed by: HOSPITALIST

## 2021-11-09 PROCEDURE — 36415 COLL VENOUS BLD VENIPUNCTURE: CPT

## 2021-11-09 PROCEDURE — 74011250637 HC RX REV CODE- 250/637: Performed by: INTERNAL MEDICINE

## 2021-11-09 PROCEDURE — 85027 COMPLETE CBC AUTOMATED: CPT

## 2021-11-09 PROCEDURE — 83735 ASSAY OF MAGNESIUM: CPT

## 2021-11-09 PROCEDURE — 74011000250 HC RX REV CODE- 250: Performed by: INTERNAL MEDICINE

## 2021-11-09 RX ORDER — POTASSIUM CHLORIDE 750 MG/1
40 TABLET, FILM COATED, EXTENDED RELEASE ORAL
Status: COMPLETED | OUTPATIENT
Start: 2021-11-09 | End: 2021-11-09

## 2021-11-09 RX ORDER — SODIUM CHLORIDE 0.9 % (FLUSH) 0.9 %
5-40 SYRINGE (ML) INJECTION AS NEEDED
Status: CANCELLED | OUTPATIENT
Start: 2021-11-09

## 2021-11-09 RX ORDER — SODIUM CHLORIDE 9 MG/ML
75 INJECTION, SOLUTION INTRAVENOUS CONTINUOUS
Status: CANCELLED | OUTPATIENT
Start: 2021-11-09

## 2021-11-09 RX ORDER — MAGNESIUM SULFATE 1 G/100ML
1 INJECTION INTRAVENOUS ONCE
Status: COMPLETED | OUTPATIENT
Start: 2021-11-09 | End: 2021-11-09

## 2021-11-09 RX ORDER — FUROSEMIDE 40 MG/1
40 TABLET ORAL DAILY
Status: DISCONTINUED | OUTPATIENT
Start: 2021-11-09 | End: 2021-11-10 | Stop reason: HOSPADM

## 2021-11-09 RX ORDER — SODIUM CHLORIDE 0.9 % (FLUSH) 0.9 %
5-40 SYRINGE (ML) INJECTION EVERY 8 HOURS
Status: CANCELLED | OUTPATIENT
Start: 2021-11-09

## 2021-11-09 RX ORDER — POTASSIUM CHLORIDE 7.45 MG/ML
10 INJECTION INTRAVENOUS
Status: DISCONTINUED | OUTPATIENT
Start: 2021-11-09 | End: 2021-11-09

## 2021-11-09 RX ADMIN — Medication 5 MG/HR: at 23:05

## 2021-11-09 RX ADMIN — Medication 10 ML: at 14:16

## 2021-11-09 RX ADMIN — LORAZEPAM 1 MG: 1 TABLET ORAL at 20:09

## 2021-11-09 RX ADMIN — MAGNESIUM SULFATE HEPTAHYDRATE 1 G: 1 INJECTION, SOLUTION INTRAVENOUS at 13:28

## 2021-11-09 RX ADMIN — Medication 10 ML: at 06:37

## 2021-11-09 RX ADMIN — POTASSIUM CHLORIDE 10 MEQ: 7.46 INJECTION, SOLUTION INTRAVENOUS at 13:26

## 2021-11-09 RX ADMIN — POTASSIUM CHLORIDE 40 MEQ: 750 TABLET, FILM COATED, EXTENDED RELEASE ORAL at 17:23

## 2021-11-09 RX ADMIN — Medication 5000 UNITS: at 13:03

## 2021-11-09 RX ADMIN — FUROSEMIDE 40 MG: 40 TABLET ORAL at 13:03

## 2021-11-09 RX ADMIN — Medication 10 ML: at 22:20

## 2021-11-09 RX ADMIN — APIXABAN 5 MG: 5 TABLET, FILM COATED ORAL at 13:03

## 2021-11-09 RX ADMIN — APIXABAN 5 MG: 5 TABLET, FILM COATED ORAL at 20:09

## 2021-11-09 RX ADMIN — FAMOTIDINE 20 MG: 20 TABLET, FILM COATED ORAL at 13:03

## 2021-11-09 RX ADMIN — METOPROLOL SUCCINATE 150 MG: 50 TABLET, EXTENDED RELEASE ORAL at 13:41

## 2021-11-09 RX ADMIN — POTASSIUM CHLORIDE 10 MEQ: 7.46 INJECTION, SOLUTION INTRAVENOUS at 15:36

## 2021-11-09 NOTE — PERIOP NOTES
Pt arrived to endo unit and placed on the monitor. HR maintaining 130's and 02 sats 89 on RA. Patient placed on 3L NC and 02 sats up to 94%. Anesthesia in to see patient. Case cancelled related to HR. Dr. Del Hines notified and states he will talk to cardiologist. Pt transported back to room. Report given to Naseem Jensen RN primary nurse.  updated and directed back to patient room.

## 2021-11-09 NOTE — PROGRESS NOTES
Hospitalist Progress Note    NAME: Michelle Madison   :  1970   MRN:  [id number]           Subjective:     Chief Complaint / Reason for Physician Visit  Patient seen and evaluated bedside, currently feels significantly better, shortness of breath has improved. Discussed with RN events overnight. Review of Systems:  Symptom Y/N Comments  Symptom Y/N Comments   Fever/Chills N   Chest Pain N    Poor Appetite N   Edema N    Cough N   Abdominal Pain N    Sputum N   Joint Pain N    SOB/MENA Y   Pruritis/Rash N    Nausea/vomit N   Tolerating PT/OT NA    Diarrhea N   Tolerating Diet Y    Constipation N   Other       Could NOT obtain due to:    Patient denies any fevers chills nausea vomiting lightheadedness dizziness chest pain headache focal weakness loss of sensation auditory or visual symptoms abdominal stool or urinary complaints or any other associated symptoms. Objective:     VITALS:   Last 24hrs VS reviewed since prior progress note. Most recent are:  Patient Vitals for the past 24 hrs:   Temp Pulse Resp BP SpO2   21 0909 97.8 °F (36.6 °C) (!) 132 22 99/73 95 %   21 0404  (!) 104  90/64    21 0348 98 °F (36.7 °C) (!) 129 28 (!) 89/65 91 %   21 0017 98.4 °F (36.9 °C) (!) 130 24 96/68 92 %   21 2042 98.2 °F (36.8 °C) (!) 129 18 97/71 93 %   21 1615 98.4 °F (36.9 °C) (!) 131 16 104/72 96 %   21 1141 97.8 °F (36.6 °C) (!) 127 18 102/73 98 %   21 1127     96 %       Intake/Output Summary (Last 24 hours) at 2021 0912  Last data filed at 2021 1748  Gross per 24 hour   Intake 480 ml   Output 550 ml   Net -70 ml        PHYSICAL EXAM:  General: Patient appears comfortable    EENT:  EOMI. Anicteric sclerae. MMM  Resp:  Decreased air entry bilaterally with appreciable bilateral bibasilar crackles  CV:  Irregularly irregular, tachycardic, S1 plus S2, no murmurs rubs or gallops,  No edema  GI:  Soft, Non distended, Non tender.   +Bowel sounds  Neurologic:  Alert and oriented X 3, normal speech,   Psych:   Good insight. Not anxious nor agitated  Skin:  No rashes. No jaundice    Procedures: see electronic medical records for all procedures/Xrays and details which were not copied into this note but were reviewed prior to creation of Plan. LABS:  I reviewed today's most current labs and imaging studies. Pertinent labs include:  Recent Labs     11/08/21  0700 11/07/21  0720 11/06/21  1140   WBC 9.2 8.7 9.1   HGB 12.8 12.9 12.8   HCT 37.6 38.1 37.0    361 343     Recent Labs     11/08/21  0700 11/07/21  0720 11/06/21  1140 11/05/21  2140 11/05/21  2140   * 133* 133*  131*   < > 128*   K 4.2 3.5 3.6  3.7   < > 4.1    101 98  98   < > 96*   CO2 22 25 25  25   < > 22   GLU 85 101* 106*  107*   < > 115*   BUN 9 6 9  9   < > 11   CREA 0.60 0.54* 0.55  0.55   < > 0.58   CA 8.8 8.9 9.1  9.0   < > 8.8   MG 2.0 1.8 2.2  2.2   < > 1.5*   PHOS  --   --  4.0  --   --    ALB  --   --  3.4*  --  3.2*   TBILI  --   --   --   --  0.4   ALT  --   --   --   --  26    < > = values in this interval not displayed. Signed: Annie Britton MD    X-ray chest:Findings/IMPRESSION: Cardiopericardial enlargement with right atrial and right  ventricular AICD leads. Central vascular congestion. Bilateral perihilar and  basilar airspace opacities, consistent with a combination of edema and  atelectasis, with associated small bilateral pleural effusions, with progression  of pleural/parenchymal opacities compared with previous. No pneumothorax or  other acute change. TTE:  · LV: Estimated LVEF is 50 - 55%. Normal cavity size and systolic function (ejection fraction normal). Septal asymmetric hypertrophy. No asymmetric gradient noted. Severe septal wall hypertrophy. Mild posterior wall hypertrophy. Abnormal left ventricular septal motion consistent with post-operative state. Mild (grade 1) left ventricular diastolic dysfunction. The findings are consistent with hypertrophic cardiomyopathy without obstruction. · TV: Mild tricuspid valve regurgitation is present. Right Ventricular Arterial Pressure (RVSP) is 32 mmHg. · LA: Moderately dilated left atrium. Reviewed most current lab test results and cultures  YES  Reviewed most current radiology test results   YES  Review and summation of old records today    NO  Reviewed patient's current orders and MAR    YES  PMH/SH reviewed - no change compared to H&P      Assessment / Plan:  Acute respiratory failure with hypoxia secondary to acute decompensated heart failure with diastolic dysfunctionpatient currently weaned down to room air, symptoms have improved, diuresing well Daily weights  Frequent intake and output monitoring  Change Lasix to 40 mg once daily  Transthoracic echo reviewed  Cardiology consult appreciated, continue to follow recommendations    Atrial fibrillation with RVRof note patient remains in atrial fibrillation with RVR, currently maxed out on Toprol, has been on anticoagulation, currently remains on Cardizem gtt., of note patient CT angio chest, patient's plan for MANDEEP cardioversion later today after initial evaluation by GI given prior history of dysfunction  Continue current medications  Continue anticoagulation  Continue to monitor post procedure  Cardiology consult appreciated, continue to follow recommendations  Electrophysiology consult appreciated, continue to follow recommendations    Hypokalemia - replete K and recheck K    Hypomagnesemia - replete Magnesium and recheck Magnesium    Vitamin D deficiencycontinue current medications    ProphylaxisEliquis  FENn.p.o., replete potassium and magnesium  Full code, patient surrogate decision-maker is her , updated at bedside  Dispositionpending clinical improvement, likely discharge home tomorrow      18.5 - 24.9 Normal weight / Body mass index is 22.91 kg/m².     Code status: Full  Prophylaxis: Eliquis  Recommended Disposition: Home w/Family     ________________________________________________________________________  Care Plan discussed with:    Comments   Patient x    Family      RN x    Care Manager x    Consultant  x                     x Multidiciplinary team rounds were held today with , nursing, pharmacist and clinical coordinator. Patient's plan of care was discussed; medications were reviewed and discharge planning was addressed.      ________________________________________________________________________  Total NON critical care TIME:  35   Minutes      Comments   >50% of visit spent in counseling and coordination of care xx    ________________________________________________________________________  Antonio Davis MD

## 2021-11-09 NOTE — PROGRESS NOTES
CARDIAC ELECTROPHYSIOLOGY PROGRESS NOTE    PATIENT NAME: Richard Chew  YOB: 1970  AGE: 45 y.o. GENDER: female      Patient seen and examined. This is a patient who is followed for atrial fibrillation with RVR. She could not get a cardioversion yesterday as CT scan incompletely opacified her atrium. She has known esophageal problems; hence a GI evaluation was requested prior to MANDEEP guided cardioversion. EGD planned initially however not performed due to RVR in am.    Telemetry reviewed, there were no events noted in the past 24 hours. AF with rates in 110-130s    Pertinent review of systems items noted above, all other systems are negative. Current medications reviewed. PHYSICAL EXAMINATION:    Visit Vitals  /71 (BP 1 Location: Left upper arm, BP Patient Position: Semi fowlers)   Pulse (!) 129   Temp 97.3 °F (36.3 °C)   Resp 22   Ht 5' 1\" (1.549 m)   Wt 55 kg (121 lb 4.1 oz)   SpO2 94%   BMI 22.91 kg/m²       No apparent distress. No JVP  Heart is Irregular rhythm  Lungs are clear bilaterally. Abdomen is soft, nontender, normal bowel sounds. Extremities have no edema. Recent labs results and imaging reviewed. Notable findings include   Lab Results   Component Value Date/Time    WBC 10.7 11/09/2021 07:38 AM    HGB 13.0 11/09/2021 07:38 AM    HCT 38.2 11/09/2021 07:38 AM    PLATELET 006 20/42/0944 07:38 AM    MCV 98.7 11/09/2021 07:38 AM     Lab Results   Component Value Date/Time    GFR est non-AA >60 11/09/2021 07:38 AM    GFR est AA >60 11/09/2021 07:38 AM    Creatinine 0.55 11/09/2021 07:38 AM    BUN 7 11/09/2021 07:38 AM    Sodium 136 11/09/2021 07:38 AM    Potassium 3.4 (L) 11/09/2021 07:38 AM    Chloride 104 11/09/2021 07:38 AM    CO2 25 11/09/2021 07:38 AM    Magnesium 1.7 11/09/2021 07:38 AM    Phosphorus 4.0 11/06/2021 11:40 AM    .       IMPRESSION AND RECOMMENDATIONS:  1. Paroxysmal atrial fibrillation  2. Hypertrophic cardiomyopathy  3.  S/p ICD insertion    - After my discussion with Dr Bandar Hanna, will try to get patient MANDEEP/DCCV either later today vs tomorrow based on anesthesia availability.  - Continue rate control meds and anticoagulation as current for now. Please do not hesitate to call if additional questions arise.     Sho Maurer MD  11/9/2021

## 2021-11-09 NOTE — PROGRESS NOTES
Progress Note    Patient: Nati Cornejo MRN: [id number]  SSN: [id number]    YOB: 1970  Age: 45 y.o. Sex: female      Admit Date: 11/5/2021    LOS: 4 days     Subjective:   GI in consultation for esophageal sphincter problem rule out contraindication for MANDEEP. The patient has a past medical history significant for congenital hypertrophic obstructive cardiomyopathy s/p ICD insertion and paroxysmal atrial fibrillation. She is on Eliquis twice daily. She was scheduled for EGD today to but it was cancelled due to elevated heart rate in the 130's. Patient is disappointed. She is in atrial fibrillation and heart rate has remained elevated. Discussed with patient and spouse if Anesthesia will clear patient will place her on tomorrow's schedule for EGD. Recommend Cardiology discuss with anesthesiology if able to proceed with planned EGD. History of Present Illness: Nati Cornejo is a 45 y.o. female who is seen in consultation for history of esophageal sphincter problem rule out contraindication for MANDEEP. Patient states she has a past medical history significant for congenital hypertrophic obstructive cardiomyopathy s/p ICD insertion,and  paroxysmal atrial fibrillation. She is on Eliquis 5 mg twice daily. Her last dose of Eliquis was this morning. . She presented to the Emergency room with complaints of dyspnea, palpitations, fatigue, and light headedness. . She is in [place] visiting from [place]. She reports she started having shortness of breath that was increasing in severity to the point she was unable to function. She states she has been taking her medications as prescribed. She is on a beta blocker, magnesium, and Eliquis at home. On arrival to the ED she was found to be in atrial fibrillation with RVR. She was started on IV diltiazem. She was admitted and cardiology was consulted for further evaluation.  She reports when she was a teenager when  she was diagnosed with an esophageal sphincter problem. She denies difficulty swallowing. She reports she has to be careful of how much food she eats because she feels full quickly. Her last EGD was at the age of 14. She reports she has had a barium swallow in the past. She denies nausea, vomiting, difficulty swallowing, diarrhea, or constipation. CTA of chest shows filling defect in the right atrial appendage suspicious for atrial appendage thrombus. Negative for PE. Plan for EGD in the am.     CTA Chest 11/8/2021: IMPRESSION  1.  14 mm filling defect in the right atrial appendage suspicious for atrial  appendage thrombus. Confirmation with echocardiography or cardiac MRI is recommended. 2.  Insufficient opacification of the left cardiac chambers to evaluate for left  sided intracardiac thrombus. Evaluation with echocardiography or cardiac MRI is recommended. 3.  Negative for pulmonary embolus. 4.  Cardiomegaly. 5.  Bilateral pleural effusions with adjacent bibasilar airspace  disease/atelectasis. 6.  Mild biapical interlobular septal thickening suggestive of trace  interstitial edema. 7.  A few small pulmonary nodules measuring up to 7 mm in the bilateral upper lobes may represent reactive intrapulmonary lymph nodes related to edema, however follow-up CT is warranted.     Chest X-ray 11/7/21: IMPRESSION  Findings/IMPRESSION: Cardiopericardial enlargement with right atrial and right ventricular AICD leads. Central vascular congestion. Bilateral perihilar and basilar airspace opacities, consistent with a combination of edema and atelectasis, with associated small bilateral pleural effusions, with progression of pleural/parenchymal opacities compared with previous. No pneumothorax or other acute change.        TTE:  · LV: Estimated LVEF is 50 - 55%. Normal cavity size and systolic function (ejection fraction normal). Septal asymmetric hypertrophy. No asymmetric gradient noted. Severe septal wall hypertrophy.  Mild posterior wall hypertrophy. Abnormal left ventricular septal motion consistent with post-operative state. Mild (grade 1) left ventricular diastolic dysfunction. The findings are consistent with hypertrophic cardiomyopathy without obstruction. · TV: Mild tricuspid valve regurgitation is present. Right Ventricular Arterial Pressure (RVSP) is 32 mmHg. · LA: Moderately dilated left atrium.             Objective:     Vitals:    11/09/21 0348 11/09/21 0404 11/09/21 0909 11/09/21 1302   BP: (!) 89/65 90/64 99/73 102/71   Pulse: (!) 129 (!) 104 (!) 132 (!) 134   Resp: 28 22 20   Temp: 98 °F (36.7 °C)  97.8 °F (36.6 °C) 97.6 °F (36.4 °C)   SpO2: 91%  95% 95%   Weight:       Height:            Intake and Output:  Current Shift: No intake/output data recorded. Last three shifts: 11/07 1901 - 11/09 0700  In: 480 [P.O.:480]  Out: 550 [Urine:550]    Physical Exam:   Skin:  Extremities and face reveal no rashes. No moran erythema. HEENT: Sclerae anicteric. Extra-occular muscles are intact. No abnormal pigmentation of the lips. The neck is supple. Cardiovascular: Regular rate and rhythm. Respiratory:  Comfortable breathing with no accessory muscle use. GI:  Abdomen nondistended, soft, and nontender. No enlargement of the liver or spleen. No masses palpable. Rectal:  Deferred  Musculoskeletal: Extremities have good range of motion. Neurological:  Gross memory appears intact. Patient is alert and oriented. Psychiatric:  Mood appears appropriate with judgement intact.   Lymphatic:  No visible adenopathy      Lab/Data Review:  Recent Results (from the past 24 hour(s))   CBC W/O DIFF    Collection Time: 11/09/21  7:38 AM   Result Value Ref Range    WBC 10.7 3.6 - 11.0 K/uL    RBC 3.87 3.80 - 5.20 M/uL    HGB 13.0 11.5 - 16.0 g/dL    HCT 38.2 35.0 - 47.0 %    MCV 98.7 80.0 - 99.0 FL    MCH 33.6 26.0 - 34.0 PG    MCHC 34.0 30.0 - 36.5 g/dL    RDW 13.3 11.5 - 14.5 %    PLATELET 774 619 - 841 K/uL    MPV 11.0 8.9 - 12.9 FL    NRBC 0.0 0.0  WBC    ABSOLUTE NRBC 0.00 0.00 - 2.23 K/uL   METABOLIC PANEL, BASIC    Collection Time: 11/09/21  7:38 AM   Result Value Ref Range    Sodium 136 136 - 145 mmol/L    Potassium 3.4 (L) 3.5 - 5.1 mmol/L    Chloride 104 97 - 108 mmol/L    CO2 25 21 - 32 mmol/L    Anion gap 7 5 - 15 mmol/L    Glucose 102 (H) 65 - 100 mg/dL    BUN 7 6 - 20 mg/dL    Creatinine 0.55 0.55 - 1.02 mg/dL    BUN/Creatinine ratio 13 12 - 20      GFR est AA >60 >60 ml/min/1.73m2    GFR est non-AA >60 >60 ml/min/1.73m2    Calcium 8.8 8.5 - 10.1 mg/dL   MAGNESIUM    Collection Time: 11/09/21  7:38 AM   Result Value Ref Range    Magnesium 1.7 1.6 - 2.4 mg/dL              CTA CHEST W OR W WO CONT   Final Result   1.  14 mm filling defect in the right atrial appendage suspicious for atrial   appendage thrombus. Confirmation with echocardiography or cardiac MRI is   recommended. 2.  Insufficient opacification of the left cardiac chambers to evaluate for left   sided intracardiac thrombus. Evaluation with echocardiography or cardiac MRI is   recommended. 3.  Negative for pulmonary embolus. 4.  Cardiomegaly. 5.  Bilateral pleural effusions with adjacent bibasilar airspace   disease/atelectasis. 6.  Mild biapical interlobular septal thickening suggestive of trace   interstitial edema. 7.  A few small pulmonary nodules measuring up to 7 mm in the bilateral upper   lobes may represent reactive intrapulmonary lymph nodes related to edema,   however follow-up CT is warranted. XR CHEST PORT   Final Result   Findings/IMPRESSION: Cardiopericardial enlargement with right atrial and right   ventricular AICD leads. Central vascular congestion. Bilateral perihilar and   basilar airspace opacities, consistent with a combination of edema and   atelectasis, with associated small bilateral pleural effusions, with progression   of pleural/parenchymal opacities compared with previous. No pneumothorax or   other acute change.       XR CHEST PORT Final Result   No previous exams are available for comparison. Patient has a 2-lead ICD in good   position. The generator is over the left anterior chest. There is no   complication. There is cardiomegaly. There is interstitial edema. There is airspace edema the   right lung base. There are small bilateral pleural fluid collection. There is no   pneumothorax. There is no mass or nodule. There is no free intraperitoneal air. These findings are consistent with acute congestive failure. Assessment:     Active Problems:    Atrial fibrillation with RVR ([address] (11/5/2021)      Hypertrophic obstructive cardiomyopathy, congenital (11/5/2021)      Atrial fibrillation ([address] (11/5/2021)        Plan:   1. Esophageal sphincter problem rule out contraindication for MANDEEP.     EGD cancelled d/t elevated heart rate on 11/9/2021     Plan for EGD if heart rate improved and cleared by anesthesiologist      NPO after midnight     Recommend Cardiology discuss with Anesthesiology if able to proceed with EGD in am.  2. Hypertrophic Obstructive Cardiomyopathy (congenital)      S/P ICD insertion     Cardiology input appreciated  3. Paroxysmal Atrial Fibrillation      Continue anticoagulation      Continue beta blocker    Thank you for allowing me to participate in this patients care   Plan discussed with Dr. Del Hines and he approves.     Signed By: Matt Nicholson NP     November 9, 2021

## 2021-11-09 NOTE — PROGRESS NOTES
Problem: Falls - Risk of  Goal: *Absence of Falls  Description: Document Wyatt Duncan Fall Risk and appropriate interventions in the flowsheet.   Outcome: Progressing Towards Goal  Note: Fall Risk Interventions:            Medication Interventions: Bed/chair exit alarm, Patient to call before getting OOB, Teach patient to arise slowly                   Problem: Patient Education: Go to Patient Education Activity  Goal: Patient/Family Education  Outcome: Progressing Towards Goal

## 2021-11-09 NOTE — PROGRESS NOTES
Cardiology Progress Note      11/9/2021 1:46 PM    Admit Date: 11/5/2021    Admit Diagnosis: Atrial fibrillation (HCC) [I48.91]  Hypertrophic obstructive cardiomyopathy, congenital [Q24.8]      Subjective:   Patient seen and examined. Heart rate remains poorly controlled; unable to proceed with EGD today due to the same. Visit Vitals  /71 (BP 1 Location: Left upper arm, BP Patient Position: Semi fowlers)   Pulse (!) 129   Temp 97.3 °F (36.3 °C)   Resp 22   Ht 5' 1\" (1.549 m)   Wt 55 kg (121 lb 4.1 oz)   SpO2 94%   BMI 22.91 kg/m²     Current Facility-Administered Medications   Medication Dose Route Frequency    furosemide (LASIX) tablet 40 mg  40 mg Oral DAILY    potassium chloride SR (KLOR-CON 10) tablet 40 mEq  40 mEq Oral NOW    famotidine (PEPCID) tablet 20 mg  20 mg Oral DAILY    cholecalciferol (VITAMIN D3) (5000 Units/125 mcg) tablet 5,000 Units  5,000 Units Oral DAILY    LORazepam (ATIVAN) tablet 1 mg  1 mg Oral Q6H PRN    melatonin tablet 5 mg  5 mg Oral QHS PRN    dilTIAZem (CARDIZEM) 125 mg/125 mL (1 mg/mL) dextrose 5% infusion  5 mg/hr IntraVENous CONTINUOUS    metoprolol succinate (TOPROL-XL) XL tablet 150 mg  150 mg Oral DAILY    metoprolol (LOPRESSOR) injection 5 mg  5 mg IntraVENous Q4H PRN    apixaban (ELIQUIS) tablet 5 mg  5 mg Oral BID    sodium chloride (NS) flush 5-40 mL  5-40 mL IntraVENous Q8H    sodium chloride (NS) flush 5-40 mL  5-40 mL IntraVENous PRN    acetaminophen (TYLENOL) tablet 650 mg  650 mg Oral Q4H PRN         Objective:      Physical Exam:  Visit Vitals  /71 (BP 1 Location: Left upper arm, BP Patient Position: Semi fowlers)   Pulse (!) 129   Temp 97.3 °F (36.3 °C)   Resp 22   Ht 5' 1\" (1.549 m)   Wt 55 kg (121 lb 4.1 oz)   SpO2 94%   BMI 22.91 kg/m²     General Appearance:  Well developed, well nourished,alert and oriented x 3, and individual in no acute distress. Ears/Nose/Mouth/Throat:   Hearing grossly normal.         Neck: Supple.    Chest: Lungs clear to auscultation bilaterally. Cardiovascular:   Irregularly irregular, S1, S2 normal, no murmur. Abdomen:   Soft, non-tender, bowel sounds are active. Extremities: No edema bilaterally. Skin: Warm and dry. Data Review:   Labs:    Recent Results (from the past 24 hour(s))   CBC W/O DIFF    Collection Time: 11/09/21  7:38 AM   Result Value Ref Range    WBC 10.7 3.6 - 11.0 K/uL    RBC 3.87 3.80 - 5.20 M/uL    HGB 13.0 11.5 - 16.0 g/dL    HCT 38.2 35.0 - 47.0 %    MCV 98.7 80.0 - 99.0 FL    MCH 33.6 26.0 - 34.0 PG    MCHC 34.0 30.0 - 36.5 g/dL    RDW 13.3 11.5 - 14.5 %    PLATELET 750 742 - 754 K/uL    MPV 11.0 8.9 - 12.9 FL    NRBC 0.0 0.0  WBC    ABSOLUTE NRBC 0.00 0.00 - 4.57 K/uL   METABOLIC PANEL, BASIC    Collection Time: 11/09/21  7:38 AM   Result Value Ref Range    Sodium 136 136 - 145 mmol/L    Potassium 3.4 (L) 3.5 - 5.1 mmol/L    Chloride 104 97 - 108 mmol/L    CO2 25 21 - 32 mmol/L    Anion gap 7 5 - 15 mmol/L    Glucose 102 (H) 65 - 100 mg/dL    BUN 7 6 - 20 mg/dL    Creatinine 0.55 0.55 - 1.02 mg/dL    BUN/Creatinine ratio 13 12 - 20      GFR est AA >60 >60 ml/min/1.73m2    GFR est non-AA >60 >60 ml/min/1.73m2    Calcium 8.8 8.5 - 10.1 mg/dL   MAGNESIUM    Collection Time: 11/09/21  7:38 AM   Result Value Ref Range    Magnesium 1.7 1.6 - 2.4 mg/dL       Telemetry: AFIB      Assessment:   Atrial fibrillation with RVR  Elevated troponins  Hypertrophic obstructive cardiomyopathy  Status post BiV ICD    Plan:   -Continue Toprol- mg p.o. daily. Keep K above 4, mag above 2. Patient has never been on propafenone in the past however she has been on sotalol which she abruptly stopped about 2 years ago due to losing insurance. She follows with electrophysiology in [place] where she resides.   For now, I would give her IV Lopressor as needed to optimize her rate control and continue Toprol-XL at 150 mg p.o. daily  -Significantly elevated troponins with flat trend likely non-MI related secondary to tachycardia in the setting of underlying hypertrophic myocardium. This is not consistent with acute coronary syndrome  -Echo shows normal LVEF with no significant LVOT obstruction  -Manage anxiety as per primary team plan which may also contribute to tachycardia  -Unable to proceed with EGD due to RVR. Will attempt MANDEEP guided cardioversion tomorrow 1230 PM with low threshold to abort the procedure in case there is difficulty intubating her; risks/benefits explained to the patient in detail including risk of esophageal injury, she wishes to proceed. Discussed with primary team.  Thank you for allowing us to participate in the care of your patient.

## 2021-11-10 ENCOUNTER — APPOINTMENT (OUTPATIENT)
Dept: NON INVASIVE DIAGNOSTICS | Age: 51
DRG: 291 | End: 2021-11-10
Attending: INTERNAL MEDICINE
Payer: COMMERCIAL

## 2021-11-10 ENCOUNTER — ANESTHESIA (OUTPATIENT)
Dept: NON INVASIVE DIAGNOSTICS | Age: 51
DRG: 291 | End: 2021-11-10
Payer: COMMERCIAL

## 2021-11-10 ENCOUNTER — ANESTHESIA EVENT (OUTPATIENT)
Dept: NON INVASIVE DIAGNOSTICS | Age: 51
DRG: 291 | End: 2021-11-10
Payer: COMMERCIAL

## 2021-11-10 VITALS
SYSTOLIC BLOOD PRESSURE: 102 MMHG | BODY MASS INDEX: 22.89 KG/M2 | WEIGHT: 121.25 LBS | TEMPERATURE: 97.3 F | RESPIRATION RATE: 8 BRPM | DIASTOLIC BLOOD PRESSURE: 79 MMHG | HEART RATE: 75 BPM | OXYGEN SATURATION: 97 % | HEIGHT: 61 IN

## 2021-11-10 LAB
ANION GAP SERPL CALC-SCNC: 6 MMOL/L (ref 5–15)
ATRIAL RATE: 441 BPM
ATRIAL RATE: 75 BPM
BUN SERPL-MCNC: 6 MG/DL (ref 6–20)
BUN/CREAT SERPL: 11 (ref 12–20)
CA-I BLD-MCNC: 8.8 MG/DL (ref 8.5–10.1)
CALCULATED P AXIS, ECG09: 50 DEGREES
CALCULATED R AXIS, ECG10: 111 DEGREES
CALCULATED R AXIS, ECG10: 97 DEGREES
CALCULATED T AXIS, ECG11: -10 DEGREES
CALCULATED T AXIS, ECG11: -10 DEGREES
CHLORIDE SERPL-SCNC: 107 MMOL/L (ref 97–108)
CO2 SERPL-SCNC: 24 MMOL/L (ref 21–32)
CREAT SERPL-MCNC: 0.56 MG/DL (ref 0.55–1.02)
DIAGNOSIS, 93000: NORMAL
DIAGNOSIS, 93000: NORMAL
ERYTHROCYTE [DISTWIDTH] IN BLOOD BY AUTOMATED COUNT: 13.6 % (ref 11.5–14.5)
GLUCOSE SERPL-MCNC: 93 MG/DL (ref 65–100)
HCT VFR BLD AUTO: 38.6 % (ref 35–47)
HGB BLD-MCNC: 12.7 G/DL (ref 11.5–16)
MAGNESIUM SERPL-MCNC: 1.9 MG/DL (ref 1.6–2.4)
MCH RBC QN AUTO: 33.1 PG (ref 26–34)
MCHC RBC AUTO-ENTMCNC: 32.9 G/DL (ref 30–36.5)
MCV RBC AUTO: 100.5 FL (ref 80–99)
NRBC # BLD: 0 K/UL (ref 0–0.01)
NRBC BLD-RTO: 0 PER 100 WBC
P-R INTERVAL, ECG05: 272 MS
PLATELET # BLD AUTO: 395 K/UL (ref 150–400)
PMV BLD AUTO: 11.3 FL (ref 8.9–12.9)
POTASSIUM SERPL-SCNC: 4.2 MMOL/L (ref 3.5–5.1)
Q-T INTERVAL, ECG07: 374 MS
Q-T INTERVAL, ECG07: 476 MS
QRS DURATION, ECG06: 138 MS
QRS DURATION, ECG06: 148 MS
QTC CALCULATION (BEZET), ECG08: 531 MS
QTC CALCULATION (BEZET), ECG08: 561 MS
RBC # BLD AUTO: 3.84 M/UL (ref 3.8–5.2)
SODIUM SERPL-SCNC: 137 MMOL/L (ref 136–145)
VENTRICULAR RATE, ECG03: 135 BPM
VENTRICULAR RATE, ECG03: 75 BPM
WBC # BLD AUTO: 10.9 K/UL (ref 3.6–11)

## 2021-11-10 PROCEDURE — 93320 DOPPLER ECHO COMPLETE: CPT

## 2021-11-10 PROCEDURE — 85027 COMPLETE CBC AUTOMATED: CPT

## 2021-11-10 PROCEDURE — 83735 ASSAY OF MAGNESIUM: CPT

## 2021-11-10 PROCEDURE — 93005 ELECTROCARDIOGRAM TRACING: CPT

## 2021-11-10 PROCEDURE — 5A2204Z RESTORATION OF CARDIAC RHYTHM, SINGLE: ICD-10-PCS | Performed by: INTERNAL MEDICINE

## 2021-11-10 PROCEDURE — 36415 COLL VENOUS BLD VENIPUNCTURE: CPT

## 2021-11-10 PROCEDURE — 74011000250 HC RX REV CODE- 250: Performed by: ANESTHESIOLOGY

## 2021-11-10 PROCEDURE — 74011250637 HC RX REV CODE- 250/637: Performed by: INTERNAL MEDICINE

## 2021-11-10 PROCEDURE — 80048 BASIC METABOLIC PNL TOTAL CA: CPT

## 2021-11-10 PROCEDURE — 74011250637 HC RX REV CODE- 250/637: Performed by: HOSPITALIST

## 2021-11-10 PROCEDURE — 74011250636 HC RX REV CODE- 250/636: Performed by: ANESTHESIOLOGY

## 2021-11-10 PROCEDURE — B24BZZ4 ULTRASONOGRAPHY OF HEART WITH AORTA, TRANSESOPHAGEAL: ICD-10-PCS | Performed by: INTERNAL MEDICINE

## 2021-11-10 RX ORDER — DIPHENHYDRAMINE HYDROCHLORIDE 50 MG/ML
12.5 INJECTION, SOLUTION INTRAMUSCULAR; INTRAVENOUS AS NEEDED
Status: DISCONTINUED | OUTPATIENT
Start: 2021-11-10 | End: 2021-11-10 | Stop reason: HOSPADM

## 2021-11-10 RX ORDER — MIDAZOLAM HYDROCHLORIDE 1 MG/ML
INJECTION, SOLUTION INTRAMUSCULAR; INTRAVENOUS AS NEEDED
Status: DISCONTINUED | OUTPATIENT
Start: 2021-11-10 | End: 2021-11-10 | Stop reason: HOSPADM

## 2021-11-10 RX ORDER — FUROSEMIDE 40 MG/1
TABLET ORAL
Qty: 30 TABLET | Refills: 0 | Status: SHIPPED | OUTPATIENT
Start: 2021-11-11

## 2021-11-10 RX ORDER — LIDOCAINE HYDROCHLORIDE 10 MG/ML
INJECTION, SOLUTION EPIDURAL; INFILTRATION; INTRACAUDAL; PERINEURAL
Status: DISCONTINUED
Start: 2021-11-10 | End: 2021-11-10 | Stop reason: HOSPADM

## 2021-11-10 RX ORDER — PROPOFOL 10 MG/ML
INJECTION, EMULSION INTRAVENOUS
Status: COMPLETED
Start: 2021-11-10 | End: 2021-11-10

## 2021-11-10 RX ORDER — MIDAZOLAM HYDROCHLORIDE 1 MG/ML
INJECTION INTRAMUSCULAR; INTRAVENOUS
Status: DISCONTINUED
Start: 2021-11-10 | End: 2021-11-10 | Stop reason: HOSPADM

## 2021-11-10 RX ORDER — PROPOFOL 10 MG/ML
INJECTION, EMULSION INTRAVENOUS AS NEEDED
Status: DISCONTINUED | OUTPATIENT
Start: 2021-11-10 | End: 2021-11-10 | Stop reason: HOSPADM

## 2021-11-10 RX ORDER — HYDROMORPHONE HYDROCHLORIDE 1 MG/ML
0.4 INJECTION, SOLUTION INTRAMUSCULAR; INTRAVENOUS; SUBCUTANEOUS
Status: DISCONTINUED | OUTPATIENT
Start: 2021-11-10 | End: 2021-11-10 | Stop reason: HOSPADM

## 2021-11-10 RX ORDER — MIDAZOLAM HYDROCHLORIDE 1 MG/ML
0.5 INJECTION, SOLUTION INTRAMUSCULAR; INTRAVENOUS
Status: DISCONTINUED | OUTPATIENT
Start: 2021-11-10 | End: 2021-11-10 | Stop reason: HOSPADM

## 2021-11-10 RX ORDER — FENTANYL CITRATE 50 UG/ML
50 INJECTION, SOLUTION INTRAMUSCULAR; INTRAVENOUS
Status: DISCONTINUED | OUTPATIENT
Start: 2021-11-10 | End: 2021-11-10 | Stop reason: HOSPADM

## 2021-11-10 RX ORDER — LIDOCAINE HYDROCHLORIDE 10 MG/ML
0.1 INJECTION, SOLUTION EPIDURAL; INFILTRATION; INTRACAUDAL; PERINEURAL AS NEEDED
Status: CANCELLED | OUTPATIENT
Start: 2021-11-10

## 2021-11-10 RX ORDER — NORETHINDRONE AND ETHINYL ESTRADIOL 0.5-0.035
5 KIT ORAL AS NEEDED
Status: DISCONTINUED | OUTPATIENT
Start: 2021-11-10 | End: 2021-11-10 | Stop reason: HOSPADM

## 2021-11-10 RX ORDER — SODIUM CHLORIDE 0.9 % (FLUSH) 0.9 %
5-40 SYRINGE (ML) INJECTION AS NEEDED
Status: DISCONTINUED | OUTPATIENT
Start: 2021-11-10 | End: 2021-11-10 | Stop reason: HOSPADM

## 2021-11-10 RX ORDER — MIDAZOLAM HYDROCHLORIDE 1 MG/ML
1 INJECTION, SOLUTION INTRAMUSCULAR; INTRAVENOUS AS NEEDED
Status: CANCELLED | OUTPATIENT
Start: 2021-11-10

## 2021-11-10 RX ORDER — METOPROLOL SUCCINATE 50 MG/1
150 TABLET, EXTENDED RELEASE ORAL DAILY
Qty: 90 TABLET | Refills: 0 | Status: SHIPPED | OUTPATIENT
Start: 2021-11-11

## 2021-11-10 RX ORDER — HYDROCODONE BITARTRATE AND ACETAMINOPHEN 5; 325 MG/1; MG/1
1 TABLET ORAL AS NEEDED
Status: DISCONTINUED | OUTPATIENT
Start: 2021-11-10 | End: 2021-11-10 | Stop reason: HOSPADM

## 2021-11-10 RX ORDER — FAMOTIDINE 20 MG/1
20 TABLET, FILM COATED ORAL DAILY
Qty: 30 TABLET | Refills: 0 | Status: SHIPPED | OUTPATIENT
Start: 2021-11-11

## 2021-11-10 RX ORDER — APIXABAN 5 MG/1
5 TABLET, FILM COATED ORAL 2 TIMES DAILY
Qty: 60 TABLET | Refills: 0 | Status: SHIPPED | OUTPATIENT
Start: 2021-11-10

## 2021-11-10 RX ORDER — SODIUM CHLORIDE 9 MG/ML
INJECTION, SOLUTION INTRAVENOUS
Status: DISCONTINUED | OUTPATIENT
Start: 2021-11-10 | End: 2021-11-10 | Stop reason: HOSPADM

## 2021-11-10 RX ORDER — CHOLECALCIFEROL (VITAMIN D3) 125 MCG
5 CAPSULE ORAL
Qty: 30 TABLET | Refills: 0 | Status: SHIPPED | OUTPATIENT
Start: 2021-11-10

## 2021-11-10 RX ORDER — FENTANYL CITRATE 50 UG/ML
50 INJECTION, SOLUTION INTRAMUSCULAR; INTRAVENOUS AS NEEDED
Status: CANCELLED | OUTPATIENT
Start: 2021-11-10

## 2021-11-10 RX ORDER — SODIUM CHLORIDE 0.9 % (FLUSH) 0.9 %
5-40 SYRINGE (ML) INJECTION EVERY 8 HOURS
Status: DISCONTINUED | OUTPATIENT
Start: 2021-11-10 | End: 2021-11-10 | Stop reason: HOSPADM

## 2021-11-10 RX ORDER — ONDANSETRON 2 MG/ML
4 INJECTION INTRAMUSCULAR; INTRAVENOUS AS NEEDED
Status: DISCONTINUED | OUTPATIENT
Start: 2021-11-10 | End: 2021-11-10 | Stop reason: HOSPADM

## 2021-11-10 RX ORDER — SODIUM CHLORIDE 0.9 % (FLUSH) 0.9 %
5-40 SYRINGE (ML) INJECTION AS NEEDED
Status: CANCELLED | OUTPATIENT
Start: 2021-11-10

## 2021-11-10 RX ORDER — SODIUM CHLORIDE 0.9 % (FLUSH) 0.9 %
5-40 SYRINGE (ML) INJECTION EVERY 8 HOURS
Status: CANCELLED | OUTPATIENT
Start: 2021-11-10

## 2021-11-10 RX ORDER — CHOLECALCIFEROL TAB 125 MCG (5000 UNIT) 125 MCG
5000 TAB ORAL DAILY
Qty: 30 TABLET | Refills: 0 | Status: SHIPPED | OUTPATIENT
Start: 2021-11-11

## 2021-11-10 RX ORDER — LIDOCAINE HYDROCHLORIDE 20 MG/ML
INJECTION, SOLUTION EPIDURAL; INFILTRATION; INTRACAUDAL; PERINEURAL AS NEEDED
Status: DISCONTINUED | OUTPATIENT
Start: 2021-11-10 | End: 2021-11-10 | Stop reason: HOSPADM

## 2021-11-10 RX ADMIN — PROPOFOL 50 MG: 10 INJECTION, EMULSION INTRAVENOUS at 12:37

## 2021-11-10 RX ADMIN — Medication 10 ML: at 05:12

## 2021-11-10 RX ADMIN — LIDOCAINE HYDROCHLORIDE 100 MG: 20 INJECTION, SOLUTION EPIDURAL; INFILTRATION; INTRACAUDAL; PERINEURAL at 12:37

## 2021-11-10 RX ADMIN — Medication 5000 UNITS: at 09:43

## 2021-11-10 RX ADMIN — PROPOFOL 20 MG: 10 INJECTION, EMULSION INTRAVENOUS at 12:41

## 2021-11-10 RX ADMIN — SODIUM CHLORIDE: 9 INJECTION, SOLUTION INTRAVENOUS at 12:36

## 2021-11-10 RX ADMIN — MIDAZOLAM HYDROCHLORIDE 2 MG: 2 INJECTION, SOLUTION INTRAMUSCULAR; INTRAVENOUS at 12:37

## 2021-11-10 RX ADMIN — FUROSEMIDE 40 MG: 40 TABLET ORAL at 09:43

## 2021-11-10 RX ADMIN — METOPROLOL SUCCINATE 150 MG: 50 TABLET, EXTENDED RELEASE ORAL at 09:42

## 2021-11-10 RX ADMIN — FAMOTIDINE 20 MG: 20 TABLET, FILM COATED ORAL at 09:43

## 2021-11-10 RX ADMIN — PROPOFOL 30 MG: 10 INJECTION, EMULSION INTRAVENOUS at 12:50

## 2021-11-10 NOTE — PROGRESS NOTES
Progress Note    Patient: Samule Goodell MRN: [id number]  SSN: [id number]    YOB: 1970  Age: 45 y.o. Sex: female      Admit Date: 11/5/2021    LOS: 5 days     Subjective:   GI in consultation for esophageal sphincter problem rule out contraindication for MANDEEP    Patient seen post MANDEEP cardioversion today. She reports she is feeling \"better\" and is anxious to go home. She was admitted with acute respiratory failure with hypoxia secondary to acute decompensated heart failure with diastolic dysfunction/atrial fibrillation with RVR. The patient has a past medical history significant for congenital hypertrophic obstructive cardiomyopathy s/p ICD insertion and paroxysmal atrial fibrillation. She is on Eliquis twice daily. History of Present Illness: Jami Mccurdy is a 45 y. o. female who is seen in consultation for history of esophageal sphincter problem rule out contraindication for MANDEEP.  Patient states she has a past medical history significant for congenital hypertrophic obstructive cardiomyopathy s/p ICD insertion,and  paroxysmal atrial fibrillation. She is on Eliquis 5 mg twice daily. Her last dose of Eliquis was this morning. . She presented to the Emergency room with complaints of dyspnea, palpitations, fatigue, and light headedness. . She is in [place] visiting from [place]. She reports she started having shortness of breath that was increasing in severity to the point she was unable to function. She states she has been taking her medications as prescribed. She is on a beta blocker, magnesium, and Eliquis at home. On arrival to the ED she was found to be in atrial fibrillation with RVR. She was started on IV diltiazem. She was admitted and cardiology was consulted for further evaluation. She reports when she was a teenager when May Blayne was diagnosed with an esophageal sphincter problem. She denies difficulty swallowing.  She reports she has to be careful of how much food she eats because she feels full quickly. Her last EGD was at the age of 14. She reports she has had a barium swallow in the past. She denies nausea, vomiting, difficulty swallowing, diarrhea, or constipation. CTA of chest shows filling defect in the right atrial appendage suspicious for atrial appendage thrombus. Negative for PE. Plan for EGD in the am.     CTA Chest 11/8/2021: IMPRESSION  1.  14 mm filling defect in the right atrial appendage suspicious for atrial  appendage thrombus. Confirmation with echocardiography or cardiac MRI is recommended. 2.  Insufficient opacification of the left cardiac chambers to evaluate for left  sided intracardiac thrombus. Evaluation with echocardiography or cardiac MRI is recommended. 3.  Negative for pulmonary embolus. 4.  Cardiomegaly. 5.  Bilateral pleural effusions with adjacent bibasilar airspace  disease/atelectasis. 6.  Mild biapical interlobular septal thickening suggestive of trace  interstitial edema. 7.  A few small pulmonary nodules measuring up to 7 mm in the bilateral upper lobes may represent reactive intrapulmonary lymph nodes related to edema, however follow-up CT is warranted.     Chest X-ray 11/7/21: IMPRESSION  Findings/IMPRESSION: Cardiopericardial enlargement with right atrial and right ventricular AICD leads. Central vascular congestion. Bilateral perihilar and basilar airspace opacities, consistent with a combination of edema and atelectasis, with associated small bilateral pleural effusions, with progression of pleural/parenchymal opacities compared with previous. No pneumothorax or other acute change.        TTE:  · LV: Estimated LVEF is 50 - 55%. Normal cavity size and systolic function (ejection fraction normal). Septal asymmetric hypertrophy. No asymmetric gradient noted. Severe septal wall hypertrophy. Mild posterior wall hypertrophy. Abnormal left ventricular septal motion consistent with post-operative state.  Mild (grade 1) left ventricular diastolic dysfunction. The findings are consistent with hypertrophic cardiomyopathy without obstruction. · TV: Mild tricuspid valve regurgitation is present. Right Ventricular Arterial Pressure (RVSP) is 32 mmHg. · LA: Moderately dilated left atrium.            Objective:     Vitals:    11/10/21 1310 11/10/21 1320 11/10/21 1325 11/10/21 1326   BP: 97/80 101/83 101/74 102/79   Pulse: 82 80 76 75   Resp: 18 16 18 8   Temp:       SpO2: 97% 96% 97%    Weight:       Height:            Intake and Output:  Current Shift: 11/10 0701 - 11/10 1900  In: 300 [I.V.:300]  Out: -   Last three shifts: 11/08 1901 - 11/10 0700  In: -   Out: 750 [Urine:750]    Physical Exam:   Skin:  Extremities and face reveal no rashes. No moran erythema. HEENT: Sclerae anicteric. Extra-occular muscles are intact. No abnormal pigmentation of the lips. The neck is supple. Cardiovascular: Regular rate and rhythm. Respiratory:  Comfortable breathing with no accessory muscle use. GI:  Abdomen nondistended, soft, and nontender. No enlargement of the liver or spleen. No masses palpable. Rectal:  Deferred  Musculoskeletal: Extremities have good range of motion. Neurological:  Gross memory appears intact. Patient is alert and oriented. Psychiatric:  Mood appears appropriate with judgement intact.   Lymphatic:  No visible adenopathy      Lab/Data Review:  Recent Results (from the past 24 hour(s))   CBC W/O DIFF    Collection Time: 11/10/21  7:15 AM   Result Value Ref Range    WBC 10.9 3.6 - 11.0 K/uL    RBC 3.84 3.80 - 5.20 M/uL    HGB 12.7 11.5 - 16.0 g/dL    HCT 38.6 35.0 - 47.0 %    .5 (H) 80.0 - 99.0 FL    MCH 33.1 26.0 - 34.0 PG    MCHC 32.9 30.0 - 36.5 g/dL    RDW 13.6 11.5 - 14.5 %    PLATELET 744 960 - 258 K/uL    MPV 11.3 8.9 - 12.9 FL    NRBC 0.0 0.0  WBC    ABSOLUTE NRBC 0.00 0.00 - 3.76 K/uL   METABOLIC PANEL, BASIC    Collection Time: 11/10/21  7:15 AM   Result Value Ref Range    Sodium 137 136 - 145 mmol/L    Potassium 4.2 3.5 - 5.1 mmol/L    Chloride 107 97 - 108 mmol/L    CO2 24 21 - 32 mmol/L    Anion gap 6 5 - 15 mmol/L    Glucose 93 65 - 100 mg/dL    BUN 6 6 - 20 mg/dL    Creatinine 0.56 0.55 - 1.02 mg/dL    BUN/Creatinine ratio 11 (L) 12 - 20      GFR est AA >60 >60 ml/min/1.73m2    GFR est non-AA >60 >60 ml/min/1.73m2    Calcium 8.8 8.5 - 10.1 mg/dL   MAGNESIUM    Collection Time: 11/10/21  7:15 AM   Result Value Ref Range    Magnesium 1.9 1.6 - 2.4 mg/dL              CTA CHEST W OR W WO CONT   Final Result   1.  14 mm filling defect in the right atrial appendage suspicious for atrial   appendage thrombus. Confirmation with echocardiography or cardiac MRI is   recommended. 2.  Insufficient opacification of the left cardiac chambers to evaluate for left   sided intracardiac thrombus. Evaluation with echocardiography or cardiac MRI is   recommended. 3.  Negative for pulmonary embolus. 4.  Cardiomegaly. 5.  Bilateral pleural effusions with adjacent bibasilar airspace   disease/atelectasis. 6.  Mild biapical interlobular septal thickening suggestive of trace   interstitial edema. 7.  A few small pulmonary nodules measuring up to 7 mm in the bilateral upper   lobes may represent reactive intrapulmonary lymph nodes related to edema,   however follow-up CT is warranted. XR CHEST PORT   Final Result   Findings/IMPRESSION: Cardiopericardial enlargement with right atrial and right   ventricular AICD leads. Central vascular congestion. Bilateral perihilar and   basilar airspace opacities, consistent with a combination of edema and   atelectasis, with associated small bilateral pleural effusions, with progression   of pleural/parenchymal opacities compared with previous. No pneumothorax or   other acute change. XR CHEST PORT   Final Result   No previous exams are available for comparison. Patient has a 2-lead ICD in good   position. The generator is over the left anterior chest. There is no   complication. There is cardiomegaly. There is interstitial edema. There is airspace edema the   right lung base. There are small bilateral pleural fluid collection. There is no   pneumothorax. There is no mass or nodule. There is no free intraperitoneal air. These findings are consistent with acute congestive failure. Assessment:     Active Problems:    Atrial fibrillation with RVR ([address] (11/5/2021)      Hypertrophic obstructive cardiomyopathy, congenital (11/5/2021)      Atrial fibrillation ([address] (11/5/2021)        Plan:   1. Esophageal sphincter problem rule out contraindication for MANDEEP.     Patient underwent MANDEEP cardioversion today w/o complication  2. Hypertrophic Obstructive Cardiomyopathy (congenital)      S/P ICD insertion     Cardiology input appreciated  3. Paroxysmal Atrial Fibrillation      Continue anticoagulation      Continue beta blocker    GI signing off at this time. Please re-consult if needed. Thank you for allowing me to participate in this patients care   Plan discussed with Dr. Miri Kamara and he approves.     Signed By: Samanta Shirley NP     November 10, 2021

## 2021-11-10 NOTE — PROGRESS NOTES
Hospitalist Progress Note    NAME: Zoe Simmons   :  1970   MRN:  [id number]           Subjective:     Chief Complaint / Reason for Physician Visit  Patient seen and evaluated bedside, currently feels significantly better, shortness of breath has improved. Discussed with RN events overnight. Review of Systems:  Symptom Y/N Comments  Symptom Y/N Comments   Fever/Chills N   Chest Pain N    Poor Appetite N   Edema N    Cough N   Abdominal Pain N    Sputum N   Joint Pain N    SOB/MENA Y   Pruritis/Rash N    Nausea/vomit N   Tolerating PT/OT NA    Diarrhea N   Tolerating Diet Y    Constipation N   Other       Could NOT obtain due to:    Patient denies any fevers chills nausea vomiting lightheadedness dizziness chest pain headache focal weakness loss of sensation auditory or visual symptoms abdominal stool or urinary complaints or any other associated symptoms. Objective:     VITALS:   Last 24hrs VS reviewed since prior progress note. Most recent are:  Patient Vitals for the past 24 hrs:   Temp Pulse Resp BP SpO2   11/10/21 0354 97.7 °F (36.5 °C) (!) 105 18 96/72 93 %   11/10/21 0119 98 °F (36.7 °C) (!) 108 18 100/68 93 %   21 2000     95 %   21 1958 97.8 °F (36.6 °C) (!) 107 20 104/74 95 %   21 1504 97.3 °F (36.3 °C) (!) 129 22 101/71 94 %   21 1302 97.6 °F (36.4 °C) (!) 134 20 102/71 95 %       Intake/Output Summary (Last 24 hours) at 11/10/2021 5185  Last data filed at 2021 1813  Gross per 24 hour   Intake    Output 750 ml   Net -750 ml        PHYSICAL EXAM:  General: Patient appears comfortable    EENT:  EOMI. Anicteric sclerae. MMM  Resp:  Decreased air entry bilaterally with appreciable bilateral bibasilar crackles  CV:  Irregularly irregular, tachycardic, S1 plus S2, no murmurs rubs or gallops,  No edema  GI:  Soft, Non distended, Non tender. +Bowel sounds  Neurologic:  Alert and oriented X 3, normal speech,   Psych:   Good insight.  Not anxious nor agitated  Skin:  No rashes. No jaundice    Procedures: see electronic medical records for all procedures/Xrays and details which were not copied into this note but were reviewed prior to creation of Plan. LABS:  I reviewed today's most current labs and imaging studies. Pertinent labs include:  Recent Labs     11/08/21  0700 11/07/21  0720 11/06/21  1140   WBC 9.2 8.7 9.1   HGB 12.8 12.9 12.8   HCT 37.6 38.1 37.0    361 343     Recent Labs     11/08/21  0700 11/07/21  0720 11/06/21  1140 11/05/21  2140 11/05/21  2140   * 133* 133*  131*   < > 128*   K 4.2 3.5 3.6  3.7   < > 4.1    101 98  98   < > 96*   CO2 22 25 25  25   < > 22   GLU 85 101* 106*  107*   < > 115*   BUN 9 6 9  9   < > 11   CREA 0.60 0.54* 0.55  0.55   < > 0.58   CA 8.8 8.9 9.1  9.0   < > 8.8   MG 2.0 1.8 2.2  2.2   < > 1.5*   PHOS  --   --  4.0  --   --    ALB  --   --  3.4*  --  3.2*   TBILI  --   --   --   --  0.4   ALT  --   --   --   --  26    < > = values in this interval not displayed. Signed: Carleen Coleman MD    X-ray chest:Findings/IMPRESSION: Cardiopericardial enlargement with right atrial and right  ventricular AICD leads. Central vascular congestion. Bilateral perihilar and  basilar airspace opacities, consistent with a combination of edema and  atelectasis, with associated small bilateral pleural effusions, with progression  of pleural/parenchymal opacities compared with previous. No pneumothorax or  other acute change. TTE:  · LV: Estimated LVEF is 50 - 55%. Normal cavity size and systolic function (ejection fraction normal). Septal asymmetric hypertrophy. No asymmetric gradient noted. Severe septal wall hypertrophy. Mild posterior wall hypertrophy. Abnormal left ventricular septal motion consistent with post-operative state. Mild (grade 1) left ventricular diastolic dysfunction. The findings are consistent with hypertrophic cardiomyopathy without obstruction.   · TV: Mild tricuspid valve regurgitation is present. Right Ventricular Arterial Pressure (RVSP) is 32 mmHg. · LA: Moderately dilated left atrium. Reviewed most current lab test results and cultures  YES  Reviewed most current radiology test results   YES  Review and summation of old records today    NO  Reviewed patient's current orders and MAR    YES  PMH/SH reviewed - no change compared to H&P      Assessment / Plan:  Acute respiratory failure with hypoxia secondary to acute decompensated heart failure with diastolic dysfunctionpatient currently weaned down to room air, symptoms have improved, diuresing well Daily weights  Frequent intake and output monitoring  Continue Lasix to 40 mg once daily  Transthoracic echo reviewed  Cardiology consult appreciated, continue to follow recommendations    Atrial fibrillation with RVRof note patient remains in atrial fibrillation with RVR, currently maxed out on Toprol, has been on anticoagulation, currently remains on Cardizem gtt., patient was planned for upper endoscopy as well as MANDEEP cardioversion yesterday however patient became transiently hypoxic, unable to obtain anesthesia yesterday therefore procedure was aborted, I have discussed with cardiology attending, plan for MANDEEP cardioversion later today at 1230   continue current medications  Continue anticoagulation  Continue to monitor post procedure  Cardiology consult appreciated, continue to follow recommendations  Electrophysiology consult appreciated, continue to follow recommendations    Hypokalemia - resolved    Hypomagnesemia - resolved    Vitamin D deficiencycontinue current medications    ProphylaxisEliquis  FENn.p.o., replete potassium and magnesium  Full code, patient surrogate decision-maker is her , updated at bedside  Dispositionpending clinical improvement, likely discharge home tomorrow      18.5 - 24.9 Normal weight / Body mass index is 22.91 kg/m².     Code status: Full  Prophylaxis: Eliquis  Recommended Disposition: Home w/Family     ________________________________________________________________________  Care Plan discussed with:    Comments   Patient x    Family      RN x    Care Manager x    Consultant  x                     x Multidiciplinary team rounds were held today with , nursing, pharmacist and clinical coordinator. Patient's plan of care was discussed; medications were reviewed and discharge planning was addressed.      ________________________________________________________________________  Total NON critical care TIME:  35   Minutes      Comments   >50% of visit spent in counseling and coordination of care xx    ________________________________________________________________________  Aleks Barcenas MD

## 2021-11-10 NOTE — PROGRESS NOTES
VSS. Patient given discharge instructions and physical RX. Family at bedside. IV removed. Telemetry removed and returned. Patient instructed to see a cardiologist and PCP once they are back home. Patient verbalized understanding. Discharge plan of care/case management plan validated with provider discharge order. Patient ready for discharge at this time.

## 2021-11-10 NOTE — DISCHARGE SUMMARY
Hospitalist Discharge Summary     Patient ID:  Andrew Durham  [id number]  45 y.o.  1970  11/5/2021    PCP on record: None    Admit date: 11/5/2021  Discharge date and time: 11/10/2021    DISCHARGE DIAGNOSIS:    Acute respiratory failure with hypoxia secondary to acute decompensated heart failure with diastolic dysfunction/atrial fibrillation with RVR/hypokalemia/hypomagnesemia/vitamin D deficiency    CONSULTATIONS:  IP CONSULT TO CARDIOLOGY  IP CONSULT TO ELECTROPHYSIOLOGY  IP CONSULT TO GASTROENTEROLOGY    Excerpted HPI from H&P of Mamie House MD:  45 y.o. female with history of congenital hypertrophic obstructive cardiomyopathy with atrial fibrillation on anticoagulation presents to the emergency room complaining of not feeling good. She is from [place], traveling at this time. But started having shortness of breath that was increasing in severity to the point that she was not able to function well so presented to the emergency room. States that she had congenital heart disease in the taking medications regularly. She was taking magnesium supplements thinking that it will help the heart rate control. She denies any chest pain, nausea or vomiting. She feels racing of her heart and feels no energy to do anything. No fever or chills. Denies any exacerbating relieving factors. Evaluation in the emergency room she is found with atrial fibrillation with rapid ventricular rate, admitted for further management. She started on IV diltiazem but did not get much improvement in heart rate. She is still in the 140s.    ______________________________________________________________________  DISCHARGE SUMMARY/HOSPITAL COURSE:  for full details see H&P, daily progress notes, labs, consult notes.      Patient was subsequently admitted to [facility] further evaluation as well as management, patient was remained on continuous telemetry monitoring, patient developed acute respiratory failure with hypoxia secondary to acute decompensated heart failure, placed on IV Lasix, patient was weaned off of oxygen, patient was brought in by cardiology, patient's Toprol was readjusted, started on Cardizem gtt., patient subsequently underwent MANDEEP cardioversion, subsequently patient went into normal sinus rhythm, patient was cleared for discharge with outpatient follow-up and establishment of care with cardiology. _______________________________________________________________________  Patient seen and examined by me on discharge day. Pertinent Findings:  Gen:    Not in distress  Chest: Clear lungs  CVS:   Regular rhythm, s1/s2 no m/r/g  No edema  Abd:  Soft, not distended, not tender  Neuro:  Alert, Oriented x 4  _______________________________________________________________________  DISCHARGE MEDICATIONS:   Current Discharge Medication List      START taking these medications    Details   cholecalciferol (VITAMIN D3) (5000 Units/125 mcg) tab tablet Take 1 Tablet by mouth daily. Qty: 30 Tablet, Refills: 0  Start date: 11/11/2021      famotidine (PEPCID) 20 mg tablet Take 1 Tablet by mouth daily. Qty: 30 Tablet, Refills: 0  Start date: 11/11/2021      furosemide (LASIX) 40 mg tablet Take 1 tablet once daily  Qty: 30 Tablet, Refills: 0  Start date: 11/11/2021      melatonin 5 mg tablet Take 1 Tablet by mouth nightly as needed (insomnia). Qty: 30 Tablet, Refills: 0  Start date: 11/10/2021         CONTINUE these medications which have CHANGED    Details   Eliquis 5 mg tablet Take 1 Tablet by mouth two (2) times a day. Qty: 60 Tablet, Refills: 0  Start date: 11/10/2021      metoprolol succinate (TOPROL-XL) 50 mg XL tablet Take 3 Tablets by mouth daily. Qty: 90 Tablet, Refills: 0  Start date: 11/11/2021         CONTINUE these medications which have NOT CHANGED    Details   magnesium oxide (MAG-OX) 400 mg tablet Take 400 mg by mouth daily.  OTC               Patient Follow Up Instructions: Activity: Activity as tolerated  Diet: Cardiac Diet  Wound Care: None needed    Follow-up with PCP/Cardiology in 2 weeks. Follow-up tests/labs As per above physician  Follow-up Information     Follow up With Specialties Details Why Contact Info    None    None ([phone]) Patient stated that they have no PCP          ________________________________________________________________    Risk of deterioration: Low    Condition at Discharge:  Stable  __________________________________________________________________    Disposition  Home with family, no needs    ____________________________________________________________________    Code Status: Full Code  ___________________________________________________________________      Total time in minutes spent coordinating this discharge (includes going over instructions, follow-up, prescriptions, and preparing report for sign off to her PCP) :  45 minutes    Signed:   Kyle Navarro MD

## 2021-11-10 NOTE — ANESTHESIA POSTPROCEDURE EVALUATION
* No procedures listed *.    total IV anesthesia, MAC    Anesthesia Post Evaluation      Multimodal analgesia: multimodal analgesia used between 6 hours prior to anesthesia start to PACU discharge  Patient location during evaluation: PACU  Patient participation: complete - patient participated  Level of consciousness: awake  Pain score: 0  Pain management: adequate  Airway patency: patent  Anesthetic complications: no  Cardiovascular status: acceptable  Respiratory status: acceptable  Hydration status: acceptable  Post anesthesia nausea and vomiting:  controlled  Final Post Anesthesia Temperature Assessment:  Normothermia (36.0-37.5 degrees C)      INITIAL Post-op Vital signs:   Vitals Value Taken Time   BP 91/71 11/10/21 1259   Temp     Pulse 77 11/10/21 1259   Resp 17 11/10/21 1259   SpO2 99 % 11/10/21 1259

## 2021-11-10 NOTE — PROGRESS NOTES
Cardiology Progress Note      11/10/2021 1:46 PM    Admit Date: 11/5/2021    Admit Diagnosis: Atrial fibrillation (HCC) [I48.91]  Hypertrophic obstructive cardiomyopathy, congenital [Q24.8]      Subjective:   Patient seen and examined. Heart rate is better controlled. She remains on Cardizem infusion. Visit Vitals  BP 96/72 (BP 1 Location: Left upper arm, BP Patient Position: At rest)   Pulse (!) 105   Temp 97.7 °F (36.5 °C)   Resp 18   Ht 5' 1\" (1.549 m)   Wt 55 kg (121 lb 4.1 oz)   SpO2 93%   BMI 22.91 kg/m²     Current Facility-Administered Medications   Medication Dose Route Frequency    furosemide (LASIX) tablet 40 mg  40 mg Oral DAILY    famotidine (PEPCID) tablet 20 mg  20 mg Oral DAILY    cholecalciferol (VITAMIN D3) (5000 Units/125 mcg) tablet 5,000 Units  5,000 Units Oral DAILY    LORazepam (ATIVAN) tablet 1 mg  1 mg Oral Q6H PRN    melatonin tablet 5 mg  5 mg Oral QHS PRN    dilTIAZem (CARDIZEM) 125 mg/125 mL (1 mg/mL) dextrose 5% infusion  5 mg/hr IntraVENous CONTINUOUS    metoprolol succinate (TOPROL-XL) XL tablet 150 mg  150 mg Oral DAILY    metoprolol (LOPRESSOR) injection 5 mg  5 mg IntraVENous Q4H PRN    apixaban (ELIQUIS) tablet 5 mg  5 mg Oral BID    sodium chloride (NS) flush 5-40 mL  5-40 mL IntraVENous Q8H    sodium chloride (NS) flush 5-40 mL  5-40 mL IntraVENous PRN    acetaminophen (TYLENOL) tablet 650 mg  650 mg Oral Q4H PRN         Objective:      Physical Exam:  Visit Vitals  BP 96/72 (BP 1 Location: Left upper arm, BP Patient Position: At rest)   Pulse (!) 105   Temp 97.7 °F (36.5 °C)   Resp 18   Ht 5' 1\" (1.549 m)   Wt 55 kg (121 lb 4.1 oz)   SpO2 93%   BMI 22.91 kg/m²     General Appearance:  Well developed, well nourished,alert and oriented x 3, and individual in no acute distress. Ears/Nose/Mouth/Throat:   Hearing grossly normal.         Neck: Supple. Chest:   Lungs clear to auscultation bilaterally.    Cardiovascular:   Irregularly irregular, S1, S2 normal, no murmur. Abdomen:   Soft, non-tender, bowel sounds are active. Extremities: No edema bilaterally. Skin: Warm and dry. Data Review:   Labs:    Recent Results (from the past 24 hour(s))   CBC W/O DIFF    Collection Time: 11/10/21  7:15 AM   Result Value Ref Range    WBC 10.9 3.6 - 11.0 K/uL    RBC 3.84 3.80 - 5.20 M/uL    HGB 12.7 11.5 - 16.0 g/dL    HCT 38.6 35.0 - 47.0 %    .5 (H) 80.0 - 99.0 FL    MCH 33.1 26.0 - 34.0 PG    MCHC 32.9 30.0 - 36.5 g/dL    RDW 13.6 11.5 - 14.5 %    PLATELET 762 733 - 157 K/uL    MPV 11.3 8.9 - 12.9 FL    NRBC 0.0 0.0  WBC    ABSOLUTE NRBC 0.00 0.00 - 6.63 K/uL   METABOLIC PANEL, BASIC    Collection Time: 11/10/21  7:15 AM   Result Value Ref Range    Sodium 137 136 - 145 mmol/L    Potassium 4.2 3.5 - 5.1 mmol/L    Chloride 107 97 - 108 mmol/L    CO2 24 21 - 32 mmol/L    Anion gap 6 5 - 15 mmol/L    Glucose 93 65 - 100 mg/dL    BUN 6 6 - 20 mg/dL    Creatinine 0.56 0.55 - 1.02 mg/dL    BUN/Creatinine ratio 11 (L) 12 - 20      GFR est AA >60 >60 ml/min/1.73m2    GFR est non-AA >60 >60 ml/min/1.73m2    Calcium 8.8 8.5 - 10.1 mg/dL   MAGNESIUM    Collection Time: 11/10/21  7:15 AM   Result Value Ref Range    Magnesium 1.9 1.6 - 2.4 mg/dL       Telemetry: AFIB      Assessment:   Atrial fibrillation with RVR  Elevated troponins  Hypertrophic obstructive cardiomyopathy  Status post BiV ICD    Plan:   -Continue Toprol- mg p.o. daily. Keep K above 4, mag above 2. Patient has never been on propafenone in the past however she has been on sotalol which she abruptly stopped about 2 years ago due to losing insurance. She follows with electrophysiology in [place] where she resides.   For now, I would give her IV Lopressor as needed to optimize her rate control and continue Toprol-XL at 150 mg p.o. daily  -Significantly elevated troponins with flat trend likely non-MI related secondary to tachycardia in the setting of underlying hypertrophic myocardium. This is not consistent with acute coronary syndrome  -Echo shows normal LVEF with no significant LVOT obstruction  -Manage anxiety as per primary team plan which may also contribute to tachycardia  -Plan transesophageal echocardiogram guided DC cardioversion at 12:30 PM today. Patient is n.p.o. Risk/benefits including esophageal injury/perforation discussed with the patient and she elects to proceed. We will have a low threshold to abort the case in case there is difficult intubation. Rest based on the procedure. Discussed with primary team.  Thank you for allowing us to participate in the care of your patient.

## 2021-11-10 NOTE — PROGRESS NOTES
Problem: Falls - Risk of  Goal: *Absence of Falls  Description: Document Teodora Boogie Fall Risk and appropriate interventions in the flowsheet.   Outcome: Progressing Towards Goal  Note: Fall Risk Interventions:            Medication Interventions: Patient to call before getting OOB, Teach patient to arise slowly                   Problem: Patient Education: Go to Patient Education Activity  Goal: Patient/Family Education  Outcome: Progressing Towards Goal

## 2022-03-19 PROBLEM — Q24.8 HYPERTROPHIC OBSTRUCTIVE CARDIOMYOPATHY, CONGENITAL: Status: ACTIVE | Noted: 2021-11-05

## 2022-03-19 PROBLEM — I48.91 ATRIAL FIBRILLATION (HCC): Status: ACTIVE | Noted: 2021-11-05

## 2022-03-19 PROBLEM — I48.91 ATRIAL FIBRILLATION WITH RVR (HCC): Status: ACTIVE | Noted: 2021-11-05

## 2023-05-15 RX ORDER — METOPROLOL SUCCINATE 50 MG/1
150 TABLET, EXTENDED RELEASE ORAL DAILY
COMMUNITY
Start: 2021-11-11

## 2023-05-15 RX ORDER — FAMOTIDINE 20 MG/1
20 TABLET, FILM COATED ORAL DAILY
COMMUNITY
Start: 2021-11-11

## 2023-05-15 RX ORDER — CHOLECALCIFEROL (VITAMIN D3) 125 MCG
5 CAPSULE ORAL
COMMUNITY
Start: 2021-11-10

## 2023-05-15 RX ORDER — FUROSEMIDE 40 MG/1
1 TABLET ORAL DAILY
COMMUNITY
Start: 2021-11-11

## 2023-05-15 RX ORDER — MAGNESIUM OXIDE 400 MG/1
400 TABLET ORAL DAILY
COMMUNITY